# Patient Record
Sex: FEMALE | Race: BLACK OR AFRICAN AMERICAN | NOT HISPANIC OR LATINO | Employment: FULL TIME | URBAN - METROPOLITAN AREA
[De-identification: names, ages, dates, MRNs, and addresses within clinical notes are randomized per-mention and may not be internally consistent; named-entity substitution may affect disease eponyms.]

---

## 2018-09-15 ENCOUNTER — HOSPITAL ENCOUNTER (EMERGENCY)
Facility: OTHER | Age: 38
Discharge: HOME | End: 2018-09-15
Attending: EMERGENCY MEDICINE
Payer: MEDICAID

## 2018-09-15 VITALS
DIASTOLIC BLOOD PRESSURE: 81 MMHG | HEIGHT: 64 IN | WEIGHT: 230 LBS | OXYGEN SATURATION: 97 % | TEMPERATURE: 99 F | HEART RATE: 86 BPM | RESPIRATION RATE: 20 BRPM | BODY MASS INDEX: 39.27 KG/M2 | SYSTOLIC BLOOD PRESSURE: 139 MMHG

## 2018-09-15 DIAGNOSIS — R81 ELEVATED SERUM GLUCOSE WITH GLUCOSURIA: ICD-10-CM

## 2018-09-15 DIAGNOSIS — R73.09 ELEVATED SERUM GLUCOSE WITH GLUCOSURIA: ICD-10-CM

## 2018-09-15 DIAGNOSIS — R73.9 HYPERGLYCEMIA: Primary | ICD-10-CM

## 2018-09-15 DIAGNOSIS — R35.0 URINARY FREQUENCY: ICD-10-CM

## 2018-09-15 LAB
ANION GAP SERPL CALC-SCNC: 10 MMOL/L
BILIRUB UR QL STRIP: NEGATIVE
BUN SERPL-MCNC: 10 MG/DL
CALCIUM SERPL-MCNC: 9.5 MG/DL
CHLORIDE SERPL-SCNC: 105 MMOL/L
CLARITY UR: CLEAR
CO2 SERPL-SCNC: 26 MMOL/L
COLOR UR: YELLOW
CREAT SERPL-MCNC: 0.8 MG/DL
EST. GFR  (AFRICAN AMERICAN): >60 ML/MIN/1.73 M^2
EST. GFR  (NON AFRICAN AMERICAN): >60 ML/MIN/1.73 M^2
GLUCOSE SERPL-MCNC: 255 MG/DL
GLUCOSE UR QL STRIP: ABNORMAL
HGB UR QL STRIP: NEGATIVE
KETONES UR QL STRIP: NEGATIVE
LEUKOCYTE ESTERASE UR QL STRIP: NEGATIVE
NITRITE UR QL STRIP: NEGATIVE
PH UR STRIP: 6 [PH] (ref 5–8)
POCT GLUCOSE: 258 MG/DL (ref 70–110)
POTASSIUM SERPL-SCNC: 3.7 MMOL/L
PROT UR QL STRIP: NEGATIVE
SODIUM SERPL-SCNC: 141 MMOL/L
SP GR UR STRIP: >=1.03 (ref 1–1.03)
URN SPEC COLLECT METH UR: ABNORMAL
UROBILINOGEN UR STRIP-ACNC: 1 EU/DL

## 2018-09-15 PROCEDURE — 80048 BASIC METABOLIC PNL TOTAL CA: CPT

## 2018-09-15 PROCEDURE — 81003 URINALYSIS AUTO W/O SCOPE: CPT

## 2018-09-15 PROCEDURE — 82962 GLUCOSE BLOOD TEST: CPT

## 2018-09-15 PROCEDURE — 99283 EMERGENCY DEPT VISIT LOW MDM: CPT | Mod: 25

## 2018-09-15 RX ORDER — INSULIN GLARGINE 100 [IU]/ML
30 INJECTION, SOLUTION SUBCUTANEOUS NIGHTLY
COMMUNITY
End: 2020-03-04 | Stop reason: CLARIF

## 2018-09-16 NOTE — ED NOTES
NEURO: Pt AAO x 4. Behavior and speech appropriate to situation.   CARDIAC: pt denies chest pain  RESPIRATORY: Respirations even and unlabored.   MUSCULOSKELETAL: Active ROM noted to extremities  GASTRO: non tender to palpation

## 2018-09-16 NOTE — ED TRIAGE NOTES
Pt presents with increased urinary frequency, onset. Pt states she is dehydrated and thinks she has a UTI. Pt states seen at [facility] yesterday for diarrhea onset 1 week ago. Mild transient abdominal pain. Pt states 3 BMS today, states diarrhea is improving. Pt denies dysuria and hematuria. Pt denies Pt denies NV, fever, chills. Pt is well appearing, pt in NAD.

## 2018-09-16 NOTE — ED PROVIDER NOTES
"Encounter Date: 9/15/2018    SCRIBE #1 NOTE: I, Lou Fang, am scribing for, and in the presence of, Dr. Loaiza.       History     Chief Complaint   Patient presents with    Urinary Frequency     with polydipsia x1 week pta, states "I just been so dehydrated lately, drinking all kinds of water and power aid", denies dysuria or blood noted in urine. pt has hx of DM but "I only take my insulin at night time"     Time seen by provider: 9:25 PM    This is a 37 y.o. female, with a history of hypertension and IDDM, who presents to the ED with complaint of "dehydration". She also reports diarrhea for the past week that occurs with consuming liquids and food.  She reports that she went to [facility] two nights ago for feeling dehydrated and they treated her for nausea - "and I wasn't even having nausea or vomiting."  She also reports feeling lightheaded and abdominal pain (cramps) preceding diarrhea. She also reports that the diarrhea has improved with consistency less watery, but bowel movements still frequent. She reports she had three bowel movements today. She also reports frequency in urination with the "feeling of a UTI", but denies any other urinary problems. She also denies fevers, chills, congestion, sore throat, cough, nausea, and vomiting.     She notes that she has a history of diabetes, but does not always take her nightly insulin.  She also admits that she does not always follow a diabetic diet.      The history is provided by the patient.     Review of patient's allergies indicates:   Allergen Reactions    Asa [aspirin]      Tongue lump up and nauseated     Past Medical History:   Diagnosis Date    Diabetes mellitus     Hypertension      Past Surgical History:   Procedure Laterality Date    ANKLE SURGERY Bilateral      SECTION      HYSTERECTOMY       History reviewed. No pertinent family history.  Social History     Tobacco Use    Smoking status: Former Smoker    Smokeless tobacco: Never Used "   Substance Use Topics    Alcohol use: Yes     Comment: socially    Drug use: No     Review of Systems   Constitutional: Negative for chills and fever.   HENT: Negative for congestion, rhinorrhea and sore throat.    Respiratory: Negative for cough.    Cardiovascular: Negative for chest pain.   Gastrointestinal: Positive for abdominal pain and diarrhea (resolved). Negative for nausea and vomiting.   Endocrine: Negative for polyuria.   Genitourinary: Positive for frequency. Negative for decreased urine volume, difficulty urinating, dysuria, enuresis, hematuria and urgency.   Musculoskeletal: Negative for back pain and neck pain.   Skin: Negative for rash.   Neurological: Positive for light-headedness. Negative for weakness.       Physical Exam     Initial Vitals [09/15/18 2054]   BP Pulse Resp Temp SpO2   137/89 97 20 98.7 °F (37.1 °C) 98 %      MAP       --         Physical Exam    Nursing note and vitals reviewed.  Constitutional: She appears well-developed and well-nourished. She is not diaphoretic. No distress.   HENT:   Head: Normocephalic and atraumatic.   Eyes: Conjunctivae and EOM are normal. No scleral icterus.   Neck: Normal range of motion. Neck supple.   Cardiovascular: Normal rate, regular rhythm and normal heart sounds. Exam reveals no gallop and no friction rub.    No murmur heard.  Pulmonary/Chest: Breath sounds normal. No respiratory distress. She has no wheezes. She has no rhonchi. She has no rales.   Abdominal: Soft. Bowel sounds are normal. She exhibits no distension. There is no tenderness. There is no rebound and no guarding.   Musculoskeletal: Normal range of motion. She exhibits no edema or tenderness.   Lymphadenopathy:     She has no cervical adenopathy.   Neurological: She is alert and oriented to person, place, and time.   Skin: Skin is warm and dry. No rash noted. No erythema. No pallor.   Psychiatric: She has a normal mood and affect. Her behavior is normal. Judgment and thought content  normal.         ED Course   Procedures  Labs Reviewed   URINALYSIS - Abnormal; Notable for the following components:       Result Value    Specific Gravity, UA >=1.030 (*)     Glucose, UA 1+ (*)     All other components within normal limits   BASIC METABOLIC PANEL - Abnormal; Notable for the following components:    Glucose 255 (*)     All other components within normal limits   POCT GLUCOSE - Abnormal; Notable for the following components:    POCT Glucose 258 (*)     All other components within normal limits          Imaging Results    None          Medical Decision Making:   Clinical Tests:   Lab Tests: Ordered and Reviewed  ED Management:  Urgent evaluation of a 37-year-old diabetic female with noncompliance who presents with complaints of feeling dehydrated, diarrhea and lightheadedness, UTI symptoms. Vital signs are benign, afebrile.  Physical exam is benign.  Mucous membranes are moist and she has no abdominal tenderness.  Urinalysis shows only glucosuria, no ketones or evidence of a UTI.  BMP shows elevated glucose only, no significant dehydration.  Patient was treated in the ED with oral fluids which she tolerated well. I advised her that I suspect most of her symptoms are related to noncompliance with insulin and elevated glucose.  She expressed understanding, and states she will start taking my medicine tonight.  I am comfortable with discharge home.  She is advised close follow-up with PCP or return for new or worsening symptoms.            Scribe Attestation:   Scribe #1: I performed the above scribed service and the documentation accurately describes the services I performed. I attest to the accuracy of the note.    Attending Attestation:           Physician Attestation for Scribe:  Physician Attestation Statement for Scribe #1: I, Dr. Loaiza, reviewed documentation, as scribed by Lou Fang in my presence, and it is both accurate and complete.                    Clinical Impression:     1.  Hyperglycemia    2. Urinary frequency    3. Elevated serum glucose with glucosuria                                   Shannan Loaiza MD  09/16/18 5506

## 2018-12-18 ENCOUNTER — HOSPITAL ENCOUNTER (EMERGENCY)
Facility: OTHER | Age: 38
Discharge: HOME | End: 2018-12-18
Attending: EMERGENCY MEDICINE
Payer: MEDICAID

## 2018-12-18 VITALS
DIASTOLIC BLOOD PRESSURE: 99 MMHG | BODY MASS INDEX: 39.27 KG/M2 | SYSTOLIC BLOOD PRESSURE: 139 MMHG | OXYGEN SATURATION: 97 % | WEIGHT: 230 LBS | TEMPERATURE: 99 F | HEART RATE: 98 BPM | HEIGHT: 64 IN | RESPIRATION RATE: 17 BRPM

## 2018-12-18 DIAGNOSIS — R05.9 COUGH: ICD-10-CM

## 2018-12-18 DIAGNOSIS — B34.9 VIRAL SYNDROME: Primary | ICD-10-CM

## 2018-12-18 LAB
DEPRECATED S PYO AG THROAT QL EIA: NEGATIVE
FLUAV AG SPEC QL IA: NEGATIVE
FLUBV AG SPEC QL IA: NEGATIVE
POCT GLUCOSE: 307 MG/DL (ref 70–110)
SPECIMEN SOURCE: NORMAL

## 2018-12-18 PROCEDURE — 25000003 PHARM REV CODE 250: Performed by: PHYSICIAN ASSISTANT

## 2018-12-18 PROCEDURE — 99284 EMERGENCY DEPT VISIT MOD MDM: CPT | Mod: 25

## 2018-12-18 PROCEDURE — 87880 STREP A ASSAY W/OPTIC: CPT

## 2018-12-18 PROCEDURE — 87081 CULTURE SCREEN ONLY: CPT

## 2018-12-18 PROCEDURE — 87400 INFLUENZA A/B EACH AG IA: CPT | Mod: 59

## 2018-12-18 PROCEDURE — 82962 GLUCOSE BLOOD TEST: CPT

## 2018-12-18 RX ORDER — IBUPROFEN 400 MG/1
800 TABLET ORAL
Status: COMPLETED | OUTPATIENT
Start: 2018-12-18 | End: 2018-12-18

## 2018-12-18 RX ORDER — NAPROXEN 500 MG/1
500 TABLET ORAL
Status: DISCONTINUED | OUTPATIENT
Start: 2018-12-18 | End: 2018-12-18

## 2018-12-18 RX ORDER — BENZONATATE 100 MG/1
100 CAPSULE ORAL 3 TIMES DAILY PRN
Qty: 20 CAPSULE | Refills: 0 | Status: SHIPPED | OUTPATIENT
Start: 2018-12-18 | End: 2018-12-28

## 2018-12-18 RX ORDER — GUAIFENESIN AND DEXTROMETHORPHAN HYDROBROMIDE 1200; 60 MG/1; MG/1
1 TABLET, EXTENDED RELEASE ORAL 2 TIMES DAILY
Qty: 20 TABLET | Refills: 0 | Status: SHIPPED | OUTPATIENT
Start: 2018-12-18 | End: 2018-12-28

## 2018-12-18 RX ORDER — FLUTICASONE PROPIONATE 50 MCG
1 SPRAY, SUSPENSION (ML) NASAL 2 TIMES DAILY PRN
Qty: 15 G | Refills: 0 | Status: SHIPPED | OUTPATIENT
Start: 2018-12-18 | End: 2019-07-28 | Stop reason: SDUPTHER

## 2018-12-18 RX ORDER — IBUPROFEN 800 MG/1
800 TABLET ORAL EVERY 6 HOURS PRN
Qty: 20 TABLET | Refills: 0 | Status: SHIPPED | OUTPATIENT
Start: 2018-12-18 | End: 2019-07-28

## 2018-12-18 RX ADMIN — IBUPROFEN 800 MG: 400 TABLET, FILM COATED ORAL at 02:12

## 2018-12-18 NOTE — ED TRIAGE NOTES
Pt presents with c/o cough, congestion, body aches, and sore throat with SOB at night, onset about a month ago. Pt tried otc meds with minimal relief. Pt denies fever/chills, n/v/d, or chest pain. Pt AAOx4, RR even and unlabored, NAD noted.

## 2018-12-18 NOTE — ED PROVIDER NOTES
Encounter Date: 2018       History     Chief Complaint   Patient presents with    Cough     Pt reports non-productive cough for the past month with SOB at night. She has been taking otc meds with no relief. Pt also reports severe body aches and sore throat since yesterday. Pt also c/o left arm pain     Patient is a 38-year-old female with a past medical history of hypertension, diabetes, presenting to the emergency department complaints of cough, nasal congestion, rhinorrhea, body aches.  The patient states that she has had a nonproductive cough for a few weeks but states she feels as though she has gotten worse.  She reports subjective fevers and chills but has not taken her temperature.  She reports a sore throat and body aches.  She states she has tried over-the-counter medication including Bre[place], NyQuil, DayQuil with no significant improvement.  She denies any chest pain.This is the extent of the patient's complaints at this time.         The history is provided by the patient.     Review of patient's allergies indicates:   Allergen Reactions    Asa [aspirin]      Tongue lump up and nauseated     Past Medical History:   Diagnosis Date    Diabetes mellitus     Hypertension      Past Surgical History:   Procedure Laterality Date    ANKLE SURGERY Bilateral      SECTION      HYSTERECTOMY       History reviewed. No pertinent family history.  Social History     Tobacco Use    Smoking status: Former Smoker    Smokeless tobacco: Never Used   Substance Use Topics    Alcohol use: Yes     Comment: socially    Drug use: No     Review of Systems   Constitutional: Positive for fatigue. Negative for activity change, appetite change, chills and fever.   HENT: Positive for congestion, rhinorrhea and sore throat.    Eyes: Negative for photophobia and visual disturbance.   Respiratory: Positive for cough. Negative for shortness of breath and wheezing.    Cardiovascular: Negative for chest pain.    Gastrointestinal: Negative for abdominal pain, diarrhea, nausea and vomiting.   Genitourinary: Negative for dysuria, hematuria and urgency.   Musculoskeletal: Positive for myalgias. Negative for back pain and neck pain.   Skin: Negative for color change and wound.   Neurological: Negative for weakness and headaches.   Psychiatric/Behavioral: Negative for agitation and confusion.       Physical Exam     Initial Vitals [12/18/18 1304]   BP Pulse Resp Temp SpO2   (!) 144/83 100 18 98 °F (36.7 °C) 96 %      MAP       --         Physical Exam    Nursing note and vitals reviewed.  Constitutional: She appears well-developed and well-nourished. She is not diaphoretic.  Non-toxic appearance. She does not have a sickly appearance. She does not appear ill. No distress.   Well-appearing, obese,  female accompanied by her  in the emergency department.  Speaking clearly in full sentences.  No acute distress.   HENT:   Head: Normocephalic and atraumatic.   Right Ear: Hearing, tympanic membrane, external ear and ear canal normal.   Left Ear: Hearing, tympanic membrane, external ear and ear canal normal.   Nose: Mucosal edema and rhinorrhea present.   Mouth/Throat: Uvula is midline, oropharynx is clear and moist and mucous membranes are normal.   Boggy nasal mucosa noted with paper towel shoved in her left nostril.  Swallowing handling oral secretions.   Eyes: Conjunctivae and EOM are normal.   Neck: Normal range of motion. Neck supple.   Cardiovascular: Normal rate, regular rhythm and normal heart sounds.   Pulmonary/Chest: Breath sounds normal. No respiratory distress. She has no wheezes.   Musculoskeletal: Normal range of motion.   Neurological: She is alert and oriented to person, place, and time. GCS eye subscore is 4. GCS verbal subscore is 5. GCS motor subscore is 6.   Skin: Skin is warm.   Psychiatric: She has a normal mood and affect. Her behavior is normal. Judgment and thought content normal.          ED Course   Procedures  Labs Reviewed   POCT GLUCOSE - Abnormal; Notable for the following components:       Result Value    POCT Glucose 307 (*)     All other components within normal limits   THROAT SCREEN, RAPID   CULTURE, STREP A,  THROAT   INFLUENZA A AND B ANTIGEN          Imaging Results          X-Ray Chest PA And Lateral (Final result)  Result time 12/18/18 13:37:27    Final result by Panchito Chaparro MD (12/18/18 13:37:27)                 Impression:      No acute chest disease identified.      Electronically signed by: Panchito Chaparro MD  Date:    12/18/2018  Time:    13:37             Narrative:    EXAMINATION:  XR CHEST PA AND LATERAL    CLINICAL HISTORY:  Cough    TECHNIQUE:  PA and lateral views of the chest were performed.    COMPARISON:  None    FINDINGS:  The cardiac silhouette and superior mediastinal structures are unremarkable. Pulmonary vasculature is within normal limits. The lungs are well aerated and free of focal consolidations. There is no evidence for pneumothorax or pleural effusions. Bony structures are grossly intact.                              X-Rays:   Independently Interpreted Readings:   Chest X-Ray: No infiltrates.  No acute abnormalities.  Normal heart size.     Medical Decision Making:   Initial Assessment:   Urgent evaluation of a 38 y.o. female with a past medical history of hypertension, diabetes, presenting to the emergency department complaining of multiple complaints. Patient is afebrile, nontoxic appearing and hemodynamically stable. Physical exam reveals boggy nasal mucosa, lungs are clear to auscultation bilaterally.  Active rhinorrhea noted.  Swallowing handling oral secretions without difficulty.  Regular rate and rhythm.  Accu-Chek, rapid strep, influenza and chest x-ray were obtained from triage.  Independently Interpreted Test(s):   I have ordered and independently interpreted X-rays - see prior notes.  Clinical Tests:   Lab Tests: Ordered and  Reviewed  Radiological Study: Ordered and Reviewed  ED Management:  Accu-Chek is 307.  Rapid strep is negative. Rapid influenza is negative. Chest x-ray is unremarkable.  Given patient's history and physical exam findings, signs symptoms likely secondary to a viral etiology.  I do not feel that antibiotics are required at this time.  She is counseled on further symptomatic care and treatment.  Will be discharged home with a prescription for Mucinex, Tessalon Perles, Flonase, ibuprofen.  Of note, she did request that I give her her scheduled carpal tunnel injections while she was here.  I explained that I do not do that, she should follow up with her primary care provider for this as scheduled.The patient was instructed to follow up with a primary care provider in 2 days or to return to the emergency department for worsening symptoms. The treatment plan was discussed with the patient who demonstrated understanding and comfort with plan.    This note was created using [employer] Fluency Direct. There may be typographical errors secondary to dictation.                         Clinical Impression:     1. Viral syndrome    2. Cough         Disposition:   Disposition: Discharged  Condition: Stable                        Kaitlyn Brewer PA-C  12/18/18 7230

## 2018-12-20 LAB — BACTERIA THROAT CULT: NORMAL

## 2019-07-28 ENCOUNTER — HOSPITAL ENCOUNTER (EMERGENCY)
Facility: OTHER | Age: 39
Discharge: HOME | End: 2019-07-29
Attending: EMERGENCY MEDICINE

## 2019-07-28 DIAGNOSIS — N89.8 VAGINAL DISCHARGE: ICD-10-CM

## 2019-07-28 DIAGNOSIS — L20.9 ATOPIC DERMATITIS, UNSPECIFIED TYPE: Primary | ICD-10-CM

## 2019-07-28 DIAGNOSIS — R73.9 HYPERGLYCEMIA WITHOUT KETOSIS: ICD-10-CM

## 2019-07-28 DIAGNOSIS — R51.9 ACUTE NONINTRACTABLE HEADACHE, UNSPECIFIED HEADACHE TYPE: ICD-10-CM

## 2019-07-28 DIAGNOSIS — R09.82 POST-NASAL DRIP: ICD-10-CM

## 2019-07-28 LAB
ANION GAP SERPL CALC-SCNC: 12 MMOL/L (ref 8–16)
B-HCG UR QL: NEGATIVE
B-OH-BUTYR BLD STRIP-SCNC: 0.1 MMOL/L (ref 0–0.5)
BACTERIA #/AREA URNS HPF: NORMAL /HPF
BASOPHILS # BLD AUTO: 0.05 K/UL (ref 0–0.2)
BASOPHILS NFR BLD: 0.7 % (ref 0–1.9)
BILIRUB UR QL STRIP: NEGATIVE
BUN SERPL-MCNC: 11 MG/DL (ref 6–20)
CALCIUM SERPL-MCNC: 9.8 MG/DL (ref 8.7–10.5)
CHLORIDE SERPL-SCNC: 100 MMOL/L (ref 95–110)
CLARITY UR: CLEAR
CO2 SERPL-SCNC: 24 MMOL/L (ref 23–29)
COLOR UR: YELLOW
CREAT SERPL-MCNC: 0.9 MG/DL (ref 0.5–1.4)
CTP QC/QA: YES
DIFFERENTIAL METHOD: ABNORMAL
EOSINOPHIL # BLD AUTO: 0.3 K/UL (ref 0–0.5)
EOSINOPHIL NFR BLD: 3.7 % (ref 0–8)
ERYTHROCYTE [DISTWIDTH] IN BLOOD BY AUTOMATED COUNT: 13.7 % (ref 11.5–14.5)
EST. GFR  (AFRICAN AMERICAN): >60 ML/MIN/1.73 M^2
EST. GFR  (NON AFRICAN AMERICAN): >60 ML/MIN/1.73 M^2
GLUCOSE SERPL-MCNC: 470 MG/DL (ref 70–110)
GLUCOSE UR QL STRIP: ABNORMAL
HCT VFR BLD AUTO: 41.2 % (ref 37–48.5)
HGB BLD-MCNC: 13.1 G/DL (ref 12–16)
HGB UR QL STRIP: ABNORMAL
IMM GRANULOCYTES # BLD AUTO: 0.02 K/UL (ref 0–0.04)
IMM GRANULOCYTES NFR BLD AUTO: 0.3 % (ref 0–0.5)
KETONES UR QL STRIP: NEGATIVE
LEUKOCYTE ESTERASE UR QL STRIP: NEGATIVE
LYMPHOCYTES # BLD AUTO: 2.5 K/UL (ref 1–4.8)
LYMPHOCYTES NFR BLD: 36.7 % (ref 18–48)
MCH RBC QN AUTO: 26.4 PG (ref 27–31)
MCHC RBC AUTO-ENTMCNC: 31.8 G/DL (ref 32–36)
MCV RBC AUTO: 83 FL (ref 82–98)
MICROSCOPIC COMMENT: NORMAL
MONOCYTES # BLD AUTO: 0.4 K/UL (ref 0.3–1)
MONOCYTES NFR BLD: 6.5 % (ref 4–15)
NEUTROPHILS # BLD AUTO: 3.6 K/UL (ref 1.8–7.7)
NEUTROPHILS NFR BLD: 52.1 % (ref 38–73)
NITRITE UR QL STRIP: NEGATIVE
NRBC BLD-RTO: 0 /100 WBC
PH UR STRIP: 6 [PH] (ref 5–8)
PLATELET # BLD AUTO: 301 K/UL (ref 150–350)
PMV BLD AUTO: 10.7 FL (ref 9.2–12.9)
POCT GLUCOSE: 434 MG/DL (ref 70–110)
POTASSIUM SERPL-SCNC: 4.2 MMOL/L (ref 3.5–5.1)
PROT UR QL STRIP: NEGATIVE
RBC # BLD AUTO: 4.96 M/UL (ref 4–5.4)
RBC #/AREA URNS HPF: 0 /HPF (ref 0–4)
SODIUM SERPL-SCNC: 136 MMOL/L (ref 136–145)
SP GR UR STRIP: 1.01 (ref 1–1.03)
URN SPEC COLLECT METH UR: ABNORMAL
UROBILINOGEN UR STRIP-ACNC: NEGATIVE EU/DL
WBC # BLD AUTO: 6.82 K/UL (ref 3.9–12.7)
WBC #/AREA URNS HPF: 2 /HPF (ref 0–5)
YEAST URNS QL MICRO: NORMAL

## 2019-07-28 PROCEDURE — 82010 KETONE BODYS QUAN: CPT

## 2019-07-28 PROCEDURE — 96375 TX/PRO/DX INJ NEW DRUG ADDON: CPT | Mod: 59

## 2019-07-28 PROCEDURE — 25000003 PHARM REV CODE 250: Performed by: PHYSICIAN ASSISTANT

## 2019-07-28 PROCEDURE — 96361 HYDRATE IV INFUSION ADD-ON: CPT

## 2019-07-28 PROCEDURE — 85025 COMPLETE CBC W/AUTO DIFF WBC: CPT

## 2019-07-28 PROCEDURE — 80048 BASIC METABOLIC PNL TOTAL CA: CPT

## 2019-07-28 PROCEDURE — 99284 EMERGENCY DEPT VISIT MOD MDM: CPT | Mod: 25

## 2019-07-28 PROCEDURE — 36415 COLL VENOUS BLD VENIPUNCTURE: CPT

## 2019-07-28 PROCEDURE — 82962 GLUCOSE BLOOD TEST: CPT

## 2019-07-28 PROCEDURE — 63600175 PHARM REV CODE 636 W HCPCS: Performed by: PHYSICIAN ASSISTANT

## 2019-07-28 PROCEDURE — 81000 URINALYSIS NONAUTO W/SCOPE: CPT

## 2019-07-28 PROCEDURE — 81025 URINE PREGNANCY TEST: CPT | Performed by: EMERGENCY MEDICINE

## 2019-07-28 PROCEDURE — 96374 THER/PROPH/DIAG INJ IV PUSH: CPT

## 2019-07-28 RX ORDER — TRIAMCINOLONE ACETONIDE 0.25 MG/G
CREAM TOPICAL 2 TIMES DAILY
Qty: 15 G | Refills: 0 | Status: SHIPPED | OUTPATIENT
Start: 2019-07-28 | End: 2020-03-04 | Stop reason: CLARIF

## 2019-07-28 RX ORDER — ACETAMINOPHEN 500 MG
1000 TABLET ORAL
Status: COMPLETED | OUTPATIENT
Start: 2019-07-28 | End: 2019-07-28

## 2019-07-28 RX ORDER — KETOROLAC TROMETHAMINE 30 MG/ML
15 INJECTION, SOLUTION INTRAMUSCULAR; INTRAVENOUS
Status: COMPLETED | OUTPATIENT
Start: 2019-07-28 | End: 2019-07-28

## 2019-07-28 RX ORDER — FLUTICASONE PROPIONATE 50 MCG
1 SPRAY, SUSPENSION (ML) NASAL 2 TIMES DAILY
Qty: 15 G | Refills: 0 | Status: SHIPPED | OUTPATIENT
Start: 2019-07-28 | End: 2020-03-04 | Stop reason: CLARIF

## 2019-07-28 RX ORDER — FLUCONAZOLE 150 MG/1
150 TABLET ORAL DAILY
Qty: 1 TABLET | Refills: 0 | Status: SHIPPED | OUTPATIENT
Start: 2019-07-28 | End: 2019-07-29

## 2019-07-28 RX ADMIN — KETOROLAC TROMETHAMINE 15 MG: 30 INJECTION, SOLUTION INTRAMUSCULAR at 10:07

## 2019-07-28 RX ADMIN — ACETAMINOPHEN 1000 MG: 500 TABLET ORAL at 11:07

## 2019-07-28 RX ADMIN — SODIUM CHLORIDE 1000 ML: 0.9 INJECTION, SOLUTION INTRAVENOUS at 11:07

## 2019-07-28 RX ADMIN — SODIUM CHLORIDE 1000 ML: 0.9 INJECTION, SOLUTION INTRAVENOUS at 10:07

## 2019-07-29 VITALS
BODY MASS INDEX: 39.82 KG/M2 | RESPIRATION RATE: 16 BRPM | WEIGHT: 233.25 LBS | HEIGHT: 64 IN | SYSTOLIC BLOOD PRESSURE: 156 MMHG | HEART RATE: 87 BPM | OXYGEN SATURATION: 98 % | TEMPERATURE: 99 F | DIASTOLIC BLOOD PRESSURE: 98 MMHG

## 2019-07-29 LAB
POCT GLUCOSE: 295 MG/DL (ref 70–110)
POCT GLUCOSE: 377 MG/DL (ref 70–110)

## 2019-07-29 PROCEDURE — 63600175 PHARM REV CODE 636 W HCPCS: Performed by: PHYSICIAN ASSISTANT

## 2019-07-29 RX ADMIN — INSULIN HUMAN 4 UNITS: 100 INJECTION, SOLUTION PARENTERAL at 12:07

## 2019-07-29 NOTE — ED NOTES
Pt rounding complete.  Pain 7/10, not requesting medication at this time.  Restroom and comfort needs addressed.  Pt updated on plan of care.  Will continue to monitor.

## 2019-07-29 NOTE — ED PROVIDER NOTES
Encounter Date: 2019       History     Chief Complaint   Patient presents with    multiple complaints     sore throat since last night, headaches causing visual disturbances x 2 wks, rash to face x 2 wks     Patient is a 38-year-old female with diabetes and previous diagnosis of hypertension that is now resolved who presents to the emergency department with multiple complaints including rash, headache, and sore throat. Patient reports 2 week history of a scaly rash to her nose and face.  Patient states she has been picking at the rash and she has been applying antibiotic ointment.  Patient also reports 3 episodes of epistaxis over the past week.  She reports daily, frontal headaches for the past 2 weeks.  She reports associated blurry vision.  She denies nausea, emesis, or photophobia.  She reports taking Aleve with relief.  She currently rates her headache a 5/10.  Patient states he has headaches feel similar to when she would would experience headaches with high blood pressure.  She states she was taken off her medication many years ago by her doctor.  Patient states she woke up this morning with a sore throat and hoarse voice.  She denies trouble breathing but states when she feels like she is gagging when she swallows.  She also reports a productive cough.  She denies fever.    The history is provided by the patient.     Review of patient's allergies indicates:   Allergen Reactions    Asa [aspirin]      Tongue lump up and nauseated     Past Medical History:   Diagnosis Date    Diabetes mellitus     Hypertension      Past Surgical History:   Procedure Laterality Date    ANKLE SURGERY Bilateral      SECTION      HYSTERECTOMY       History reviewed. No pertinent family history.  Social History     Tobacco Use    Smoking status: Former Smoker    Smokeless tobacco: Never Used   Substance Use Topics    Alcohol use: Yes     Comment: socially    Drug use: No     Review of Systems    Constitutional: Negative for chills and fever.   HENT: Positive for postnasal drip, sore throat and voice change. Negative for facial swelling.    Eyes: Positive for visual disturbance.   Respiratory: Positive for cough. Negative for shortness of breath.    Cardiovascular: Negative for chest pain.   Gastrointestinal: Negative for abdominal pain, diarrhea, nausea and vomiting.   Genitourinary: Negative for dysuria.   Musculoskeletal: Negative for arthralgias.   Skin: Positive for rash.   Neurological: Positive for headaches.       Physical Exam     Initial Vitals [07/28/19 2115]   BP Pulse Resp Temp SpO2   (!) 140/91 (!) 114 20 98.5 °F (36.9 °C) (!) 93 %      MAP       --         Physical Exam    Constitutional: Vital signs are normal. She is Obese . She is cooperative. No distress.   Patient appears anxious   HENT:   Head: Normocephalic and atraumatic.   Nose: Nose normal.   Hoarse voice.  Cobblestoning to posterior oropharynx.  Tonsils are not enlarged.  No exudates. Uvula is midline. No trismus.   Eyes: EOM are normal. Pupils are equal, round, and reactive to light.   Neck: Normal range of motion. Neck supple.   Cardiovascular: Regular rhythm and intact distal pulses. Tachycardia present.    Pulmonary/Chest: Breath sounds normal. She has no wheezes. She has no rhonchi. She has no rales.   Abdominal: Soft. Bowel sounds are normal. There is no tenderness.   Musculoskeletal: Normal range of motion. She exhibits no edema.   Neurological: She is alert and oriented to person, place, and time. She has normal strength. No cranial nerve deficit. GCS score is 15. GCS eye subscore is 4. GCS verbal subscore is 5. GCS motor subscore is 6.   Skin: Skin is warm and dry. Rash (Erythematous scaly rash most prominent to the nose, scattered lesions noted to the forehead and eyebrows.  No pustules, crusting or drainage) noted.         ED Course   Procedures  Labs Reviewed   CBC W/ AUTO DIFFERENTIAL - Abnormal; Notable for the  following components:       Result Value    Mean Corpuscular Hemoglobin 26.4 (*)     Mean Corpuscular Hemoglobin Conc 31.8 (*)     All other components within normal limits   BASIC METABOLIC PANEL - Abnormal; Notable for the following components:    Glucose 470 (*)     All other components within normal limits    Narrative:        critical result(s) called and verbal readback obtained from   NAVARRO MUNSON RN, 07/28/2019 22:47   URINALYSIS, REFLEX TO URINE CULTURE - Abnormal; Notable for the following components:    Glucose, UA 3+ (*)     Occult Blood UA Trace (*)     All other components within normal limits    Narrative:     Preferred Collection Type->Urine, Clean Catch   POCT GLUCOSE - Abnormal; Notable for the following components:    POCT Glucose 434 (*)     All other components within normal limits   POCT GLUCOSE - Abnormal; Notable for the following components:    POCT Glucose 377 (*)     All other components within normal limits   URINALYSIS MICROSCOPIC    Narrative:     Preferred Collection Type->Urine, Clean Catch   BETA - HYDROXYBUTYRATE, SERUM   POCT URINE PREGNANCY   POCT GLUCOSE MONITORING CONTINUOUS          Imaging Results    None          Medical Decision Making:   Initial Assessment:   Urgent evaluation of a 38 y.o. Female presenting to the emergency department complaining of rash, headaches, and sore throat. Patient is afebrile, nontoxic appearing and hemodynamically stable.  Initial triage vital signs reports hypoxia, repeat pulse ox was 98%.  There is no focal weakness, numbness, meningismus, or other focal neurologic deficit. There is no history of trauma or fever.  Patient does have history of hypertension and blood pressure is mildly elevated here in the emergency department today.  I do not suspect SAH.  Patient has no evidence of strep pharyngitis, PTA, RPA or epiglottitis.  Patient has evidence of postnasal drip which is likely causing sore throat and cough.  Patient has atopic dermatitis rash  on her face.  Will obtain blood work and provide patient with IV fluids and Toradol.    ED Management:  Accu-Chek was 434.  CBC is no significant abnormality.  Chemistry reveals glucose of 470.  Normal bicarbonate and anion gap.  Hydroxybutyrate is normal and there are no ketones in her urine.  No evidence of DKA.  Hyperglycemia is likely contributing to patient's symptoms  After 2 L of IV fluids, patient's repeat Accu-Chek is 377.  Will administer 4 units of insulin.  Case discussed with khushbu Banks signed over at 1 am  Other:   I have discussed this case with another health care provider.       <> Summary of the Discussion: Patient also mentioned upon discharge that she has white, clumpy vaginal discharge and some irritation.  This is likely secondary to yeast infection.  Will send home with Diflucan and advised pt to follow up with her regular doctor.                      Clinical Impression:     1. Atopic dermatitis, unspecified type    2. Hyperglycemia without ketosis    3. Post-nasal drip    4. Acute nonintractable headache, unspecified headache type    5. Vaginal discharge                               Alberto Kang PA-C  07/29/19 0105

## 2019-07-29 NOTE — ED TRIAGE NOTES
"Pt arrived with multiple complaints.  Reports rash to L side of nose x 1 week, states "it started out kind of scaly and I've been picking at it so it got worse."  Lesion appears red and purple, pt states she has been applying topical abx.    Pt also c/o sore throat.  States she woke up with a hoarse voice.  Denies any difficulty breathing, reports some difficulty swallowing, states, "It feels like I'm gagging."    Pt also states she has had 3 nosebleeds in the past 2 days.  Reports they last about 3 minutes.    Pt also c/o "tingling to her L ear since yesterday."    Reports HA x 2.5 weeks that are starting to cause "blurry vision."    Pt states she has been "googling her symptoms and is afraid her diabetes is acting up, but was unable to reach her PCP."  "

## 2019-10-06 ENCOUNTER — HOSPITAL ENCOUNTER (INPATIENT)
Facility: OTHER | Age: 39
LOS: 4 days | Discharge: HOME | DRG: 871 | End: 2019-10-10
Attending: EMERGENCY MEDICINE | Admitting: HOSPITALIST

## 2019-10-06 DIAGNOSIS — Z79.4 TYPE 2 DIABETES MELLITUS WITH HYPERGLYCEMIA, WITH LONG-TERM CURRENT USE OF INSULIN: ICD-10-CM

## 2019-10-06 DIAGNOSIS — J18.9 PNEUMONIA OF RIGHT LOWER LOBE DUE TO INFECTIOUS ORGANISM: Primary | ICD-10-CM

## 2019-10-06 DIAGNOSIS — R82.71 ASYMPTOMATIC BACTERIURIA: ICD-10-CM

## 2019-10-06 DIAGNOSIS — I10 ESSENTIAL HYPERTENSION: ICD-10-CM

## 2019-10-06 DIAGNOSIS — E11.65 TYPE 2 DIABETES MELLITUS WITH HYPERGLYCEMIA, WITH LONG-TERM CURRENT USE OF INSULIN: ICD-10-CM

## 2019-10-06 DIAGNOSIS — R73.9 HYPERGLYCEMIA: ICD-10-CM

## 2019-10-06 DIAGNOSIS — N12 PYELONEPHRITIS: ICD-10-CM

## 2019-10-06 DIAGNOSIS — R00.0 TACHYCARDIA: ICD-10-CM

## 2019-10-06 PROBLEM — N39.0 UTI (URINARY TRACT INFECTION): Status: ACTIVE | Noted: 2019-10-06

## 2019-10-06 PROBLEM — E11.9 TYPE 2 DIABETES MELLITUS, WITH LONG-TERM CURRENT USE OF INSULIN: Status: ACTIVE | Noted: 2019-10-06

## 2019-10-06 LAB
ALBUMIN SERPL BCP-MCNC: 3.1 G/DL (ref 3.5–5.2)
ALLENS TEST: ABNORMAL
ALP SERPL-CCNC: 109 U/L (ref 55–135)
ALT SERPL W/O P-5'-P-CCNC: 27 U/L (ref 10–44)
ANION GAP SERPL CALC-SCNC: 10 MMOL/L (ref 8–16)
ANION GAP SERPL CALC-SCNC: 12 MMOL/L (ref 8–16)
AST SERPL-CCNC: 28 U/L (ref 10–40)
B-HCG UR QL: NEGATIVE
B-OH-BUTYR BLD STRIP-SCNC: 0.1 MMOL/L (ref 0–0.5)
BACTERIA #/AREA URNS HPF: ABNORMAL /HPF
BASOPHILS # BLD AUTO: 0.03 K/UL (ref 0–0.2)
BASOPHILS NFR BLD: 0.4 % (ref 0–1.9)
BILIRUB SERPL-MCNC: 0.6 MG/DL (ref 0.1–1)
BILIRUB UR QL STRIP: NEGATIVE
BILIRUB UR QL STRIP: NEGATIVE
BUN SERPL-MCNC: 6 MG/DL (ref 6–20)
BUN SERPL-MCNC: 6 MG/DL (ref 6–20)
CALCIUM SERPL-MCNC: 8.5 MG/DL (ref 8.7–10.5)
CALCIUM SERPL-MCNC: 9.7 MG/DL (ref 8.7–10.5)
CHLORIDE SERPL-SCNC: 101 MMOL/L (ref 95–110)
CHLORIDE SERPL-SCNC: 97 MMOL/L (ref 95–110)
CLARITY UR: ABNORMAL
CLARITY UR: ABNORMAL
CO2 SERPL-SCNC: 22 MMOL/L (ref 23–29)
CO2 SERPL-SCNC: 22 MMOL/L (ref 23–29)
COLOR UR: YELLOW
COLOR UR: YELLOW
CREAT SERPL-MCNC: 0.8 MG/DL (ref 0.5–1.4)
CREAT SERPL-MCNC: 1 MG/DL (ref 0.5–1.4)
CTP QC/QA: YES
DELSYS: ABNORMAL
DIFFERENTIAL METHOD: ABNORMAL
EOSINOPHIL # BLD AUTO: 0 K/UL (ref 0–0.5)
EOSINOPHIL NFR BLD: 0.1 % (ref 0–8)
ERYTHROCYTE [DISTWIDTH] IN BLOOD BY AUTOMATED COUNT: 13.6 % (ref 11.5–14.5)
ERYTHROCYTE [SEDIMENTATION RATE] IN BLOOD BY WESTERGREN METHOD: 18 MM/H
EST. GFR  (AFRICAN AMERICAN): >60 ML/MIN/1.73 M^2
EST. GFR  (AFRICAN AMERICAN): >60 ML/MIN/1.73 M^2
EST. GFR  (NON AFRICAN AMERICAN): >60 ML/MIN/1.73 M^2
EST. GFR  (NON AFRICAN AMERICAN): >60 ML/MIN/1.73 M^2
FIO2: 21
GLUCOSE SERPL-MCNC: 326 MG/DL (ref 70–110)
GLUCOSE SERPL-MCNC: 398 MG/DL (ref 70–110)
GLUCOSE UR QL STRIP: ABNORMAL
GLUCOSE UR QL STRIP: ABNORMAL
HCO3 UR-SCNC: 23.8 MMOL/L (ref 24–28)
HCT VFR BLD AUTO: 40.3 % (ref 37–48.5)
HGB BLD-MCNC: 12.9 G/DL (ref 12–16)
HGB UR QL STRIP: ABNORMAL
HGB UR QL STRIP: ABNORMAL
IMM GRANULOCYTES # BLD AUTO: 0.05 K/UL (ref 0–0.04)
IMM GRANULOCYTES NFR BLD AUTO: 0.6 % (ref 0–0.5)
INFLUENZA A, MOLECULAR: NEGATIVE
INFLUENZA B, MOLECULAR: NEGATIVE
KETONES UR QL STRIP: NEGATIVE
KETONES UR QL STRIP: NEGATIVE
LACTATE SERPL-SCNC: 4.1 MMOL/L (ref 0.5–2.2)
LACTATE SERPL-SCNC: 4.1 MMOL/L (ref 0.5–2.2)
LEUKOCYTE ESTERASE UR QL STRIP: ABNORMAL
LEUKOCYTE ESTERASE UR QL STRIP: ABNORMAL
LYMPHOCYTES # BLD AUTO: 1.2 K/UL (ref 1–4.8)
LYMPHOCYTES NFR BLD: 13.9 % (ref 18–48)
MAGNESIUM SERPL-MCNC: 1.6 MG/DL (ref 1.6–2.6)
MCH RBC QN AUTO: 26.2 PG (ref 27–31)
MCHC RBC AUTO-ENTMCNC: 32 G/DL (ref 32–36)
MCV RBC AUTO: 82 FL (ref 82–98)
MICROSCOPIC COMMENT: ABNORMAL
MODE: ABNORMAL
MONOCYTES # BLD AUTO: 0.6 K/UL (ref 0.3–1)
MONOCYTES NFR BLD: 7.6 % (ref 4–15)
NEUTROPHILS # BLD AUTO: 6.5 K/UL (ref 1.8–7.7)
NEUTROPHILS NFR BLD: 77.4 % (ref 38–73)
NITRITE UR QL STRIP: NEGATIVE
NITRITE UR QL STRIP: NEGATIVE
NRBC BLD-RTO: 0 /100 WBC
PCO2 BLDA: 38.2 MMHG (ref 35–45)
PH SMN: 7.4 [PH] (ref 7.35–7.45)
PH UR STRIP: 6 [PH] (ref 5–8)
PH UR STRIP: 6 [PH] (ref 5–8)
PLATELET # BLD AUTO: 246 K/UL (ref 150–350)
PMV BLD AUTO: 11.3 FL (ref 9.2–12.9)
PO2 BLDA: 21 MMHG (ref 40–60)
POC BE: -1 MMOL/L
POC SATURATED O2: 34 % (ref 95–100)
POCT GLUCOSE: 380 MG/DL (ref 70–110)
POCT GLUCOSE: 430 MG/DL (ref 70–110)
POTASSIUM SERPL-SCNC: 4 MMOL/L (ref 3.5–5.1)
POTASSIUM SERPL-SCNC: 4.6 MMOL/L (ref 3.5–5.1)
PROCALCITONIN SERPL IA-MCNC: 0.46 NG/ML
PROT SERPL-MCNC: 7.9 G/DL (ref 6–8.4)
PROT UR QL STRIP: ABNORMAL
PROT UR QL STRIP: ABNORMAL
RBC # BLD AUTO: 4.92 M/UL (ref 4–5.4)
RBC #/AREA URNS HPF: 4 /HPF (ref 0–4)
SAMPLE: ABNORMAL
SITE: ABNORMAL
SODIUM SERPL-SCNC: 131 MMOL/L (ref 136–145)
SODIUM SERPL-SCNC: 133 MMOL/L (ref 136–145)
SP GR UR STRIP: <=1.005 (ref 1–1.03)
SP GR UR STRIP: <=1.005 (ref 1–1.03)
SP02: 97
SPECIMEN SOURCE: NORMAL
SQUAMOUS #/AREA URNS HPF: 6 /HPF
URN SPEC COLLECT METH UR: ABNORMAL
URN SPEC COLLECT METH UR: ABNORMAL
UROBILINOGEN UR STRIP-ACNC: NEGATIVE EU/DL
UROBILINOGEN UR STRIP-ACNC: NEGATIVE EU/DL
WBC # BLD AUTO: 8.41 K/UL (ref 3.9–12.7)
WBC #/AREA URNS HPF: 15 /HPF (ref 0–5)
WBC CLUMPS URNS QL MICRO: ABNORMAL
YEAST URNS QL MICRO: ABNORMAL

## 2019-10-06 PROCEDURE — 87088 URINE BACTERIA CULTURE: CPT

## 2019-10-06 PROCEDURE — 96361 HYDRATE IV INFUSION ADD-ON: CPT

## 2019-10-06 PROCEDURE — 93005 ELECTROCARDIOGRAM TRACING: CPT

## 2019-10-06 PROCEDURE — 85025 COMPLETE CBC W/AUTO DIFF WBC: CPT

## 2019-10-06 PROCEDURE — 81025 URINE PREGNANCY TEST: CPT | Performed by: NURSE PRACTITIONER

## 2019-10-06 PROCEDURE — 83605 ASSAY OF LACTIC ACID: CPT

## 2019-10-06 PROCEDURE — 93010 EKG 12-LEAD: ICD-10-PCS | Mod: ,,, | Performed by: INTERNAL MEDICINE

## 2019-10-06 PROCEDURE — 80048 BASIC METABOLIC PNL TOTAL CA: CPT

## 2019-10-06 PROCEDURE — 82010 KETONE BODYS QUAN: CPT

## 2019-10-06 PROCEDURE — 84145 PROCALCITONIN (PCT): CPT

## 2019-10-06 PROCEDURE — 99223 1ST HOSP IP/OBS HIGH 75: CPT | Mod: ,,, | Performed by: HOSPITALIST

## 2019-10-06 PROCEDURE — 87502 INFLUENZA DNA AMP PROBE: CPT

## 2019-10-06 PROCEDURE — 93010 ELECTROCARDIOGRAM REPORT: CPT | Mod: ,,, | Performed by: INTERNAL MEDICINE

## 2019-10-06 PROCEDURE — 11000001 HC ACUTE MED/SURG PRIVATE ROOM

## 2019-10-06 PROCEDURE — 82962 GLUCOSE BLOOD TEST: CPT

## 2019-10-06 PROCEDURE — 83735 ASSAY OF MAGNESIUM: CPT

## 2019-10-06 PROCEDURE — 36415 COLL VENOUS BLD VENIPUNCTURE: CPT

## 2019-10-06 PROCEDURE — 82803 BLOOD GASES ANY COMBINATION: CPT

## 2019-10-06 PROCEDURE — 99223 PR INITIAL HOSPITAL CARE,LEVL III: ICD-10-PCS | Mod: ,,, | Performed by: PHYSICIAN ASSISTANT

## 2019-10-06 PROCEDURE — 63600175 PHARM REV CODE 636 W HCPCS: Performed by: PHYSICIAN ASSISTANT

## 2019-10-06 PROCEDURE — 81000 URINALYSIS NONAUTO W/SCOPE: CPT

## 2019-10-06 PROCEDURE — 99291 CRITICAL CARE FIRST HOUR: CPT | Mod: 25

## 2019-10-06 PROCEDURE — 87040 BLOOD CULTURE FOR BACTERIA: CPT

## 2019-10-06 PROCEDURE — 25000003 PHARM REV CODE 250: Performed by: NURSE PRACTITIONER

## 2019-10-06 PROCEDURE — 87086 URINE CULTURE/COLONY COUNT: CPT

## 2019-10-06 PROCEDURE — 96374 THER/PROPH/DIAG INJ IV PUSH: CPT

## 2019-10-06 PROCEDURE — 96375 TX/PRO/DX INJ NEW DRUG ADDON: CPT

## 2019-10-06 PROCEDURE — 25000003 PHARM REV CODE 250: Performed by: PHYSICIAN ASSISTANT

## 2019-10-06 PROCEDURE — 87186 SC STD MICRODIL/AGAR DIL: CPT

## 2019-10-06 PROCEDURE — 99223 PR INITIAL HOSPITAL CARE,LEVL III: ICD-10-PCS | Mod: ,,, | Performed by: HOSPITALIST

## 2019-10-06 PROCEDURE — 63600175 PHARM REV CODE 636 W HCPCS: Performed by: NURSE PRACTITIONER

## 2019-10-06 PROCEDURE — 83036 HEMOGLOBIN GLYCOSYLATED A1C: CPT

## 2019-10-06 PROCEDURE — 87077 CULTURE AEROBIC IDENTIFY: CPT

## 2019-10-06 PROCEDURE — 80053 COMPREHEN METABOLIC PANEL: CPT

## 2019-10-06 PROCEDURE — 99223 1ST HOSP IP/OBS HIGH 75: CPT | Mod: ,,, | Performed by: PHYSICIAN ASSISTANT

## 2019-10-06 PROCEDURE — 99900035 HC TECH TIME PER 15 MIN (STAT)

## 2019-10-06 RX ORDER — SODIUM CHLORIDE 0.9 % (FLUSH) 0.9 %
10 SYRINGE (ML) INJECTION
Status: DISCONTINUED | OUTPATIENT
Start: 2019-10-06 | End: 2019-10-10 | Stop reason: HOSPADM

## 2019-10-06 RX ORDER — GUAIFENESIN 100 MG/5ML
200 SOLUTION ORAL EVERY 4 HOURS PRN
Status: DISCONTINUED | OUTPATIENT
Start: 2019-10-06 | End: 2019-10-10 | Stop reason: HOSPADM

## 2019-10-06 RX ORDER — BENZONATATE 100 MG/1
100 CAPSULE ORAL 3 TIMES DAILY PRN
Status: DISCONTINUED | OUTPATIENT
Start: 2019-10-06 | End: 2019-10-10 | Stop reason: HOSPADM

## 2019-10-06 RX ORDER — IBUPROFEN 200 MG
16 TABLET ORAL
Status: DISCONTINUED | OUTPATIENT
Start: 2019-10-06 | End: 2019-10-10 | Stop reason: HOSPADM

## 2019-10-06 RX ORDER — AZITHROMYCIN 250 MG/1
1000 TABLET, FILM COATED ORAL
Status: COMPLETED | OUTPATIENT
Start: 2019-10-06 | End: 2019-10-06

## 2019-10-06 RX ORDER — KETOROLAC TROMETHAMINE 30 MG/ML
10 INJECTION, SOLUTION INTRAMUSCULAR; INTRAVENOUS
Status: COMPLETED | OUTPATIENT
Start: 2019-10-06 | End: 2019-10-06

## 2019-10-06 RX ORDER — GLUCAGON 1 MG
1 KIT INJECTION
Status: DISCONTINUED | OUTPATIENT
Start: 2019-10-06 | End: 2019-10-10 | Stop reason: HOSPADM

## 2019-10-06 RX ORDER — IBUPROFEN 200 MG
24 TABLET ORAL
Status: DISCONTINUED | OUTPATIENT
Start: 2019-10-06 | End: 2019-10-10 | Stop reason: HOSPADM

## 2019-10-06 RX ORDER — ACETAMINOPHEN 500 MG
1000 TABLET ORAL
Status: COMPLETED | OUTPATIENT
Start: 2019-10-06 | End: 2019-10-06

## 2019-10-06 RX ORDER — INSULIN ASPART 100 [IU]/ML
1-10 INJECTION, SOLUTION INTRAVENOUS; SUBCUTANEOUS
Status: DISCONTINUED | OUTPATIENT
Start: 2019-10-06 | End: 2019-10-10 | Stop reason: HOSPADM

## 2019-10-06 RX ADMIN — ACETAMINOPHEN 1000 MG: 500 TABLET ORAL at 06:10

## 2019-10-06 RX ADMIN — CEFTRIAXONE 2 G: 2 INJECTION, SOLUTION INTRAVENOUS at 07:10

## 2019-10-06 RX ADMIN — SODIUM CHLORIDE 1000 ML: 0.9 INJECTION, SOLUTION INTRAVENOUS at 06:10

## 2019-10-06 RX ADMIN — KETOROLAC TROMETHAMINE 10 MG: 30 INJECTION, SOLUTION INTRAMUSCULAR; INTRAVENOUS at 06:10

## 2019-10-06 RX ADMIN — SODIUM CHLORIDE 1500 ML: 0.9 INJECTION, SOLUTION INTRAVENOUS at 07:10

## 2019-10-06 RX ADMIN — AZITHROMYCIN MONOHYDRATE 1000 MG: 250 TABLET ORAL at 07:10

## 2019-10-06 NOTE — ED NOTES
2 Patient identifiers, allergies, and medications verified.    LOC: Patient is awake, drowsy, weak, but oriented x3. Pt aware of environment with an appropriate affect and speaking appropriate.    APPEARANCE: Patient in discomfort with complaints of body aches, patient is clean and well groomed; clothing is properly fastened.    SKIN: fever present, normal skin turgor and moist mucus membranes; no rashes or lesions present. Skin Intact, No breakdown noted.    Musculoskeletal:  Patient complains of weakness and body aches    RESPIRATORY: Airway is open and patent, unlabored, spontaneous bilateral respirations spontaneous; normal effort and rate.     CARDIAC: tachycardic    PULSES: 2+; symmetrical in all 4 extremities    NEUROLOGIC: PERRLA, symmetrical facial expression; normal sensation to all 4 extremities.          Call light within reach, continuous monitoring in place, remains in stretcher at 45 degree angle and wheels locked, VSS, will continue to monitor.       Fiance at bedside

## 2019-10-06 NOTE — ED TRIAGE NOTES
Patient reported to ED with complaints of weakness throughout body, headache, hyperglycemia (430 upon arrival), and nausea. Patient transported to ED room via wheelchair due to weakness. Patient tachycardic (120s), with 102.7 temperature. Patient remains in stretcher with wheels locked. 12 lead performed. EKG obtained. Cultures and nasal swab sent to lab. AAOx3, VSS, will continue to monitor closely, continuous monitoring in place.

## 2019-10-07 LAB
ANION GAP SERPL CALC-SCNC: 11 MMOL/L (ref 8–16)
ANISOCYTOSIS BLD QL SMEAR: SLIGHT
BASOPHILS # BLD AUTO: ABNORMAL K/UL (ref 0–0.2)
BASOPHILS NFR BLD: 0 % (ref 0–1.9)
BUN SERPL-MCNC: 6 MG/DL (ref 6–20)
CALCIUM SERPL-MCNC: 8.7 MG/DL (ref 8.7–10.5)
CHLORIDE SERPL-SCNC: 101 MMOL/L (ref 95–110)
CO2 SERPL-SCNC: 20 MMOL/L (ref 23–29)
CREAT SERPL-MCNC: 0.9 MG/DL (ref 0.5–1.4)
DIFFERENTIAL METHOD: ABNORMAL
EOSINOPHIL # BLD AUTO: ABNORMAL K/UL (ref 0–0.5)
EOSINOPHIL NFR BLD: 0 % (ref 0–8)
ERYTHROCYTE [DISTWIDTH] IN BLOOD BY AUTOMATED COUNT: 13.5 % (ref 11.5–14.5)
EST. GFR  (AFRICAN AMERICAN): >60 ML/MIN/1.73 M^2
EST. GFR  (NON AFRICAN AMERICAN): >60 ML/MIN/1.73 M^2
ESTIMATED AVG GLUCOSE: 355 MG/DL (ref 68–131)
GLUCOSE SERPL-MCNC: 391 MG/DL (ref 70–110)
HBA1C MFR BLD HPLC: 14 % (ref 4–5.6)
HCT VFR BLD AUTO: 35.7 % (ref 37–48.5)
HGB BLD-MCNC: 11.4 G/DL (ref 12–16)
HYPOCHROMIA BLD QL SMEAR: ABNORMAL
IMM GRANULOCYTES # BLD AUTO: ABNORMAL K/UL (ref 0–0.04)
IMM GRANULOCYTES NFR BLD AUTO: ABNORMAL % (ref 0–0.5)
LACTATE SERPL-SCNC: 1.8 MMOL/L (ref 0.5–2.2)
LACTATE SERPL-SCNC: 3.9 MMOL/L (ref 0.5–2.2)
LYMPHOCYTES # BLD AUTO: ABNORMAL K/UL (ref 1–4.8)
LYMPHOCYTES NFR BLD: 10 % (ref 18–48)
MCH RBC QN AUTO: 26.5 PG (ref 27–31)
MCHC RBC AUTO-ENTMCNC: 31.9 G/DL (ref 32–36)
MCV RBC AUTO: 83 FL (ref 82–98)
MONOCYTES # BLD AUTO: ABNORMAL K/UL (ref 0.3–1)
MONOCYTES NFR BLD: 5 % (ref 4–15)
NEUTROPHILS NFR BLD: 81 % (ref 38–73)
NEUTS BAND NFR BLD MANUAL: 4 %
NRBC BLD-RTO: 0 /100 WBC
PLATELET # BLD AUTO: 229 K/UL (ref 150–350)
PMV BLD AUTO: 10.4 FL (ref 9.2–12.9)
POCT GLUCOSE: 302 MG/DL (ref 70–110)
POCT GLUCOSE: 324 MG/DL (ref 70–110)
POCT GLUCOSE: 336 MG/DL (ref 70–110)
POCT GLUCOSE: 337 MG/DL (ref 70–110)
POCT GLUCOSE: 345 MG/DL (ref 70–110)
POCT GLUCOSE: 372 MG/DL (ref 70–110)
POTASSIUM SERPL-SCNC: 4.6 MMOL/L (ref 3.5–5.1)
RBC # BLD AUTO: 4.31 M/UL (ref 4–5.4)
SODIUM SERPL-SCNC: 132 MMOL/L (ref 136–145)
TOXIC GRANULES BLD QL SMEAR: PRESENT
WBC # BLD AUTO: 9.38 K/UL (ref 3.9–12.7)

## 2019-10-07 PROCEDURE — 25000003 PHARM REV CODE 250: Performed by: PHYSICIAN ASSISTANT

## 2019-10-07 PROCEDURE — 85027 COMPLETE CBC AUTOMATED: CPT

## 2019-10-07 PROCEDURE — C9399 UNCLASSIFIED DRUGS OR BIOLOG: HCPCS | Performed by: PHYSICIAN ASSISTANT

## 2019-10-07 PROCEDURE — 87205 SMEAR GRAM STAIN: CPT

## 2019-10-07 PROCEDURE — 83605 ASSAY OF LACTIC ACID: CPT | Mod: 91

## 2019-10-07 PROCEDURE — 87070 CULTURE OTHR SPECIMN AEROBIC: CPT

## 2019-10-07 PROCEDURE — 87186 SC STD MICRODIL/AGAR DIL: CPT

## 2019-10-07 PROCEDURE — 80048 BASIC METABOLIC PNL TOTAL CA: CPT

## 2019-10-07 PROCEDURE — 11000001 HC ACUTE MED/SURG PRIVATE ROOM

## 2019-10-07 PROCEDURE — C9399 UNCLASSIFIED DRUGS OR BIOLOG: HCPCS | Performed by: HOSPITALIST

## 2019-10-07 PROCEDURE — 25000003 PHARM REV CODE 250: Performed by: HOSPITALIST

## 2019-10-07 PROCEDURE — 63600175 PHARM REV CODE 636 W HCPCS: Performed by: HOSPITALIST

## 2019-10-07 PROCEDURE — 87077 CULTURE AEROBIC IDENTIFY: CPT

## 2019-10-07 PROCEDURE — 94761 N-INVAS EAR/PLS OXIMETRY MLT: CPT

## 2019-10-07 PROCEDURE — 85007 BL SMEAR W/DIFF WBC COUNT: CPT

## 2019-10-07 PROCEDURE — 36415 COLL VENOUS BLD VENIPUNCTURE: CPT

## 2019-10-07 PROCEDURE — 63600175 PHARM REV CODE 636 W HCPCS: Performed by: PHYSICIAN ASSISTANT

## 2019-10-07 PROCEDURE — 87632 RESP VIRUS 6-11 TARGETS: CPT

## 2019-10-07 PROCEDURE — 83605 ASSAY OF LACTIC ACID: CPT

## 2019-10-07 RX ORDER — ONDANSETRON 2 MG/ML
4 INJECTION INTRAMUSCULAR; INTRAVENOUS EVERY 6 HOURS PRN
Status: DISCONTINUED | OUTPATIENT
Start: 2019-10-07 | End: 2019-10-10 | Stop reason: HOSPADM

## 2019-10-07 RX ORDER — ACETAMINOPHEN 325 MG/1
650 TABLET ORAL EVERY 6 HOURS PRN
Status: DISCONTINUED | OUTPATIENT
Start: 2019-10-07 | End: 2019-10-08

## 2019-10-07 RX ORDER — TRAMADOL HYDROCHLORIDE 50 MG/1
50 TABLET ORAL EVERY 6 HOURS PRN
Status: DISCONTINUED | OUTPATIENT
Start: 2019-10-07 | End: 2019-10-07

## 2019-10-07 RX ORDER — SODIUM CHLORIDE 9 MG/ML
INJECTION, SOLUTION INTRAVENOUS CONTINUOUS
Status: DISCONTINUED | OUTPATIENT
Start: 2019-10-07 | End: 2019-10-10 | Stop reason: HOSPADM

## 2019-10-07 RX ORDER — IBUPROFEN 600 MG/1
600 TABLET ORAL ONCE
Status: COMPLETED | OUTPATIENT
Start: 2019-10-07 | End: 2019-10-07

## 2019-10-07 RX ORDER — ENOXAPARIN SODIUM 100 MG/ML
40 INJECTION SUBCUTANEOUS EVERY 24 HOURS
Status: DISCONTINUED | OUTPATIENT
Start: 2019-10-07 | End: 2019-10-10 | Stop reason: HOSPADM

## 2019-10-07 RX ORDER — TRAMADOL HYDROCHLORIDE 50 MG/1
50 TABLET ORAL ONCE
Status: COMPLETED | OUTPATIENT
Start: 2019-10-07 | End: 2019-10-07

## 2019-10-07 RX ORDER — ACETAMINOPHEN 500 MG
1000 TABLET ORAL ONCE
Status: COMPLETED | OUTPATIENT
Start: 2019-10-07 | End: 2019-10-07

## 2019-10-07 RX ADMIN — ENOXAPARIN SODIUM 40 MG: 100 INJECTION SUBCUTANEOUS at 05:10

## 2019-10-07 RX ADMIN — INSULIN ASPART 5 UNITS: 100 INJECTION, SOLUTION INTRAVENOUS; SUBCUTANEOUS at 10:10

## 2019-10-07 RX ADMIN — ACETAMINOPHEN 650 MG: 325 TABLET, FILM COATED ORAL at 12:10

## 2019-10-07 RX ADMIN — ACETAMINOPHEN 1000 MG: 500 TABLET, FILM COATED ORAL at 01:10

## 2019-10-07 RX ADMIN — INSULIN ASPART 8 UNITS: 100 INJECTION, SOLUTION INTRAVENOUS; SUBCUTANEOUS at 05:10

## 2019-10-07 RX ADMIN — SODIUM CHLORIDE: 0.9 INJECTION, SOLUTION INTRAVENOUS at 01:10

## 2019-10-07 RX ADMIN — CEFTRIAXONE 1 G: 1 INJECTION, SOLUTION INTRAVENOUS at 09:10

## 2019-10-07 RX ADMIN — INSULIN ASPART 10 UNITS: 100 INJECTION, SOLUTION INTRAVENOUS; SUBCUTANEOUS at 09:10

## 2019-10-07 RX ADMIN — BENZONATATE 100 MG: 100 CAPSULE ORAL at 08:10

## 2019-10-07 RX ADMIN — TRAMADOL HYDROCHLORIDE 50 MG: 50 TABLET, FILM COATED ORAL at 10:10

## 2019-10-07 RX ADMIN — INSULIN ASPART 4 UNITS: 100 INJECTION, SOLUTION INTRAVENOUS; SUBCUTANEOUS at 01:10

## 2019-10-07 RX ADMIN — IBUPROFEN 600 MG: 600 TABLET, FILM COATED ORAL at 03:10

## 2019-10-07 RX ADMIN — INSULIN DETEMIR 21 UNITS: 100 INJECTION, SOLUTION SUBCUTANEOUS at 12:10

## 2019-10-07 RX ADMIN — GUAIFENESIN 200 MG: 100 SOLUTION ORAL at 10:10

## 2019-10-07 RX ADMIN — ONDANSETRON 4 MG: 2 INJECTION INTRAMUSCULAR; INTRAVENOUS at 01:10

## 2019-10-07 RX ADMIN — INSULIN ASPART 8 UNITS: 100 INJECTION, SOLUTION INTRAVENOUS; SUBCUTANEOUS at 12:10

## 2019-10-07 RX ADMIN — ACETAMINOPHEN 650 MG: 325 TABLET, FILM COATED ORAL at 08:10

## 2019-10-07 RX ADMIN — INSULIN DETEMIR 30 UNITS: 100 INJECTION, SOLUTION SUBCUTANEOUS at 10:10

## 2019-10-07 RX ADMIN — AZITHROMYCIN MONOHYDRATE 500 MG: 500 INJECTION, POWDER, LYOPHILIZED, FOR SOLUTION INTRAVENOUS at 10:10

## 2019-10-07 NOTE — ED PROVIDER NOTES
Encounter Date: 10/6/2019       History     Chief Complaint   Patient presents with    Chills     Pt complains of being febrile with generalized body aches x 2 days. Flu like symptoms    Fever     Patient is a 38-year-old female with history of diabetes and hypertension who presents to the emergency department with body aches and chills.  Patient states over the last 24 hr she has not been feeling well. She reports cough with no sputum production.  She denies chest pain or shortness of breath. She states she is having chills that are so severe that it hurts all of her bones and joints. She reports muscle aches.  She reports nausea with no vomiting.  She states she has lower back pain bilaterally that radiates into both hips.  She denies dysuria.  She denies vomiting or diarrhea.  She states she has not been compliant with hypertension medications or diabetic medications because of insurance reasons.  She denies any blood per rectum.  She denies any recent travel.  She denies any recent ingestion of raw meat or raw fish.  She denies recent hospitalization.  She denies recent antibiotics.    The history is provided by the patient.     Review of patient's allergies indicates:   Allergen Reactions    Asa [aspirin]      Tongue lump up and nauseated     Past Medical History:   Diagnosis Date    Diabetes mellitus     Hypertension      Past Surgical History:   Procedure Laterality Date    ANKLE SURGERY Bilateral      SECTION      HYSTERECTOMY       No family history on file.  Social History     Tobacco Use    Smoking status: Former Smoker    Smokeless tobacco: Never Used   Substance Use Topics    Alcohol use: Yes     Comment: socially    Drug use: No     Review of Systems   Constitutional: Positive for activity change, appetite change, chills, fatigue and fever.   HENT: Negative for congestion, ear discharge, ear pain, postnasal drip, rhinorrhea, sore throat and trouble swallowing.    Respiratory:  Positive for cough.    Cardiovascular: Negative for chest pain, palpitations and leg swelling.   Gastrointestinal: Positive for nausea. Negative for abdominal pain, blood in stool, constipation, diarrhea and vomiting.   Genitourinary: Positive for flank pain. Negative for dysuria and hematuria.   Musculoskeletal: Positive for arthralgias, back pain and myalgias. Negative for neck pain and neck stiffness.   Skin: Negative for rash and wound.   Neurological: Positive for weakness (generalized). Negative for dizziness, light-headedness and headaches.       Physical Exam     Initial Vitals [10/06/19 1652]   BP Pulse Resp Temp SpO2   134/75 (!) 118 (!) 23 (!) 102.7 °F (39.3 °C) 97 %      MAP       --         Physical Exam    Nursing note and vitals reviewed.  Constitutional: She appears well-developed and well-nourished. She is not diaphoretic. She is Obese .  Non-toxic appearance. She has a sickly appearance. She appears ill. No distress.   HENT:   Head: Normocephalic.   Right Ear: Hearing and external ear normal.   Left Ear: Hearing and external ear normal.   Nose: Nose normal.   Mouth/Throat: Uvula is midline and oropharynx is clear and moist. Mucous membranes are dry. No oropharyngeal exudate.   Eyes: Conjunctivae are normal. Pupils are equal, round, and reactive to light.   Neck: Normal range of motion and full passive range of motion without pain. Neck supple. No neck rigidity.   Cardiovascular: Regular rhythm.   tachycardic   Pulmonary/Chest: Breath sounds normal. No respiratory distress. She has no wheezes. She has no rhonchi. She has no rales. She exhibits no tenderness.   Abdominal: Soft. Bowel sounds are normal. She exhibits no distension and no mass. There is no tenderness. There is CVA tenderness (bilateral). There is no rebound and no guarding.   Lymphadenopathy:     She has no cervical adenopathy.   Neurological: She is alert and oriented to person, place, and time.   Skin: Skin is warm and dry. Capillary  refill takes less than 2 seconds.   Psychiatric: She has a normal mood and affect.         ED Course   Critical Care  Date/Time: 10/6/2019 7:45 PM  Performed by: Radha Brooks PA-C  Authorized by: Tramaine Morgan MD   Direct patient critical care time: 15 minutes  Ordering / reviewing critical care time: 5 minutes  Documentation critical care time: 5 minutes  Consulting other physicians critical care time: 5 minutes  Total critical care time (exclusive of procedural time) : 30 minutes  Critical care was necessary to treat or prevent imminent or life-threatening deterioration of the following conditions: sepsis, shock, endocrine crisis and dehydration.  Critical care was time spent personally by me on the following activities: blood draw for specimens, development of treatment plan with patient or surrogate, discussions with consultants, evaluation of patient's response to treatment, examination of patient, obtaining history from patient or surrogate, ordering and performing treatments and interventions, ordering and review of laboratory studies, pulse oximetry and review of old charts.  Subsequent provider of critical care: I assumed direction of critical care for this patient from another provider of my specialty.        Labs Reviewed   URINALYSIS, REFLEX TO URINE CULTURE - Abnormal; Notable for the following components:       Result Value    Appearance, UA Hazy (*)     Specific Gravity, UA <=1.005 (*)     Protein, UA Trace (*)     Glucose, UA 3+ (*)     Occult Blood UA 1+ (*)     Leukocytes, UA Trace (*)     All other components within normal limits    Narrative:     Preferred Collection Type->Urine, Clean Catch   CBC W/ AUTO DIFFERENTIAL - Abnormal; Notable for the following components:    Mean Corpuscular Hemoglobin 26.2 (*)     Immature Granulocytes 0.6 (*)     Immature Grans (Abs) 0.05 (*)     Gran% 77.4 (*)     Lymph% 13.9 (*)     All other components within normal limits   COMPREHENSIVE  METABOLIC PANEL - Abnormal; Notable for the following components:    Sodium 131 (*)     CO2 22 (*)     Glucose 398 (*)     Albumin 3.1 (*)     All other components within normal limits   LACTIC ACID, PLASMA - Abnormal; Notable for the following components:    Lactate (Lactic Acid) 4.1 (*)     All other components within normal limits    Narrative:     Lactic Acid critical result(s) called and verbal readback obtained   from  Tere Summers RN/ER., 10/06/2019 18:46   LACTIC ACID, PLASMA - Abnormal; Notable for the following components:    Lactate (Lactic Acid) 4.1 (*)     All other components within normal limits    Narrative:      Lactic Acid  critical result(s) called and verbal readback obtained   from Tere Summers RN/ER., 10/06/2019 18:43   URINALYSIS, REFLEX TO URINE CULTURE - Abnormal; Notable for the following components:    Appearance, UA Hazy (*)     Specific Gravity, UA <=1.005 (*)     Protein, UA Trace (*)     Glucose, UA 3+ (*)     Occult Blood UA 1+ (*)     Leukocytes, UA Trace (*)     All other components within normal limits    Narrative:     Preferred Collection Type->Urine, Clean Catch   URINALYSIS MICROSCOPIC - Abnormal; Notable for the following components:    WBC, UA 15 (*)     WBC Clumps, UA Occasional (*)     Bacteria Few (*)     All other components within normal limits    Narrative:     Preferred Collection Type->Urine, Clean Catch   POCT GLUCOSE - Abnormal; Notable for the following components:    POCT Glucose 430 (*)     All other components within normal limits   ISTAT PROCEDURE - Abnormal; Notable for the following components:    POC PO2 21 (*)     POC HCO3 23.8 (*)     POC SATURATED O2 34 (*)     All other components within normal limits   POCT GLUCOSE - Abnormal; Notable for the following components:    POCT Glucose 380 (*)     All other components within normal limits   INFLUENZA A & B BY MOLECULAR   CULTURE, BLOOD   CULTURE, BLOOD   CULTURE, URINE   BETA - HYDROXYBUTYRATE, SERUM   POCT  URINE PREGNANCY   POCT GLUCOSE MONITORING CONTINUOUS          Imaging Results          X-Ray Chest AP Portable (Final result)  Result time 10/06/19 18:20:13    Final result by Jaciel Espitia MD (10/06/19 18:20:13)                 Impression:      Airspace opacity in the right lung base, consistent with pneumonia.      Electronically signed by: Jaciel Espitia MD  Date:    10/06/2019  Time:    18:20             Narrative:    EXAMINATION:  XR CHEST AP PORTABLE    CLINICAL HISTORY:  Sepsis;    TECHNIQUE:  Single frontal view of the chest was performed.    COMPARISON:  12/18/2018.    FINDINGS:  Monitoring EKG leads are present.  The trachea is unremarkable.  The cardiac silhouette is upper limits of normal on this AP projection.  There is elevation of both hemidiaphragms.  There are no pleural effusions.  There is no evidence of a pneumothorax.  There is no evidence of pneumomediastinum.  There is an airspace opacity in the right lung base.  The osseous structures are unremarkable.                              X-Rays:   Independently Interpreted Readings:   Other Readings:  Right lower lung opacification    Medical Decision Making:   Initial Assessment:   Urgent evaluation of a 38-year-old female with history of hypertension and diabetes who presents to the emergency department with fevers and chills. Patient is febrile and tachycardic on initial exam.  She is ill-appearing.  Benign abdominal exam.  No nuchal rigidity.  Lungs are clear to auscultation.  Dry mucous membranes.  Glucose is significantly elevated.  Patient meets SIRS criteria.  Sepsis workup in DKA workup will be performed.  Clinical Tests:   Lab Tests: Ordered and Reviewed  Radiological Study: Ordered and Reviewed  ED Management:  1845  Labs reveal no leukocytosis.  No kidney insufficiency or electrolyte disturbance.  No gap.  PH is normal.  No evidence of DKA.  Lactic acid is elevated.      7:23 PM  Source is found.  UTI and pneumonia.  Pt will be given  rocephin and azithromycin at this time.  On repeat exam, pt does have bilateral CVA tenderness.  Will discuss case with hospital medicine and case management.    7:37 PM                        Clinical Impression:       ICD-10-CM ICD-9-CM   1. Pneumonia of right lower lobe due to infectious organism J18.1 486   2. Tachycardia R00.0 785.0   3. Pyelonephritis N12 590.80                                Radha Brooks PA-C  10/06/19 1946

## 2019-10-07 NOTE — ASSESSMENT & PLAN NOTE
- hyperglycemic w/ glycosuria at time of admission   - no DKA   - states she is non-compliant with meds   - A1C pending for AM   - Diabetic diet   - SSI with accuchekcs AC/HS

## 2019-10-07 NOTE — PLAN OF CARE
Discharge Planning:  Patient admitted on 10-6-19  LOS-day 1  Chart reviewed, Care plan discussed with pt  Discussed care plan with treatment team,  attending Dr Robles  Consults following are: case mgt  PCP updated in [facility]: yes  Pharmacy, updated in [facility]: [facility] [address]  Insurance: pending (states her work ([email or web address]) is eligible next month, technically she is [place] pending   DME at home: glucometer  Current dispo: pending, see H and P  Transportation: yes  Case management to follow        10/07/19 1332   Discharge Assessment   Assessment Type Discharge Planning Assessment   Confirmed/corrected address and phone number on facesheet? Yes   Assessment information obtained from? Patient;Caregiver;Medical Record   Communicated expected length of stay with patient/caregiver yes   Prior to hospitilization cognitive status: Alert/Oriented   Prior to hospitalization functional status: Independent   Current cognitive status: Alert/Oriented   Current Functional Status: Independent   Lives With child(von), adult   Able to Return to Prior Arrangements yes   Is patient able to care for self after discharge? Yes   Patient currently being followed by outpatient case management? No   Patient currently receives any other outside agency services? No   Equipment Currently Used at Home glucometer   Do you have any problems affording any of your prescribed medications? No   Is the patient taking medications as prescribed? yes   Does the patient have transportation home? Yes   Transportation Anticipated family or friend will provide   Discharge Plan A Home   DME Needed Upon Discharge  other (see comments)  (b/p machine/cuff)   Patient/Family in Agreement with Plan yes

## 2019-10-07 NOTE — PLAN OF CARE
Patient is alert and oriented x 4. Pt is  repositions self and ambulating independently. Pt becomes afebrile this shift and temp reported to Amrailys Murillo. Ibuprofen is ordered along with placing ice to arms and groin. Decrease of temp is noted.Pt is voidina accurately up to toilet. Drainage is clear and yellow. Pt has one episode of vomiting this shift and intermittent nausea. Zofran is given and relief is noted.  at bedtime. Pt is covered with 4 units of Aspart. Pt remains free from falls and injury this shift. All needs and concerns are met. Hourly rounding is done. Bed is locked and in lowest position, side rails up x2, call light in reach. Will continue to monitor.

## 2019-10-07 NOTE — ASSESSMENT & PLAN NOTE
- Ms. Car Barrios is admitted to inpatient status   - she has a RLL PNA   - PNA studies ordered   - PORT score: was 59 at worst,  39 at time of admission  - procalcitonin pending  - continue CAP treatment w/ rocephin & azithromycin  - PRN cough meds ordered   - monitor

## 2019-10-07 NOTE — PLAN OF CARE
Patient is awake, alert, and oriented.  Purposeful, hourly roundings done.  Vital signs WNL. Afebrile.  Pain managed with current PO regimen.   Pt c/o headaches earlier in shift. One time dose of 1g tylenol was given which eased the pain.  Peripheral IV intact.  Pt ambulated in room without difficulties and position self in bed independently.  Tolerating diet well. No nausea or vomiting.  Accuchecks ac/hs. Remains elevated throughout shift (high 300s).  Voids spontaneously.  Reviewed plan of care with patient. Reassurance was provided.  Call light within reach.  Continue monitoring.

## 2019-10-07 NOTE — H&P
[facility] Hospital Medicine  History & Physical    Patient Name: Car Barrios  MRN: [id number]  Admission Date: 10/6/2019  Attending Physician: Kem Robles MD   Primary Care Provider: Primary Doctor No         Patient information was obtained from patient, past medical records and ER records.     Subjective:     Principal Problem:Pneumonia of right lower lobe due to infectious organism    Chief Complaint:   Chief Complaint   Patient presents with    Chills     Pt complains of being febrile with generalized body aches x 2 days. Flu like symptoms    Fever        HPI: Ms. Car Barrios is a 38 y.o. female, with PMH of IDDM-2, HTN, who presented to [facility] ED on 10/6/19 2/2 chills and body aches x 2 days. She notes associated non-productive cough, myalgias, nausea, low back pain. She denies chest pain, SOB, V/D, dysuria. She endorses medication non-compliance 2/2 insurance issues. Upon presentation she was found to be febrile up to 102.7.F PO. She was evaluated in the ED, and noted to have elevated lactic acid, hyperglycemia without open anion gap, and normal beta-hydroxybutyrate, and UTI. She was treated with rocephin/azithromycin and admitted to inpatient status.     Past Medical History:   Diagnosis Date    Diabetes mellitus     Hypertension        Past Surgical History:   Procedure Laterality Date    ANKLE SURGERY Bilateral      SECTION      HYSTERECTOMY         Review of patient's allergies indicates:   Allergen Reactions    Asa [aspirin]      Tongue lump up and nauseated       No current facility-administered medications on file prior to encounter.      Current Outpatient Medications on File Prior to Encounter   Medication Sig    fluticasone propionate (FLONASE) 50 mcg/actuation nasal spray 1 spray (50 mcg total) by Each Nostril route 2 (two) times daily.    insulin glargine (BASAGLAR KWIKPEN U-100 INSULIN) 100 unit/mL (3 mL) InPn pen Inject 30 Units into the skin every  evening.    triamcinolone acetonide 0.025% (KENALOG) 0.025 % cream Apply topically 2 (two) times daily.     Family History     None        Tobacco Use    Smoking status: Never Smoker    Smokeless tobacco: Never Used   Substance and Sexual Activity    Alcohol use: Yes     Comment: socially    Drug use: No    Sexual activity: Yes     Partners: Male     Review of Systems   Constitutional: Positive for diaphoresis. Negative for appetite change, chills, fatigue and fever.   HENT: Negative for congestion, ear pain, postnasal drip, rhinorrhea, sinus pressure, sore throat and trouble swallowing.    Respiratory: Positive for cough. Negative for shortness of breath and wheezing.    Cardiovascular: Negative for chest pain and palpitations.   Gastrointestinal: Negative for abdominal pain, diarrhea, nausea and vomiting.   Genitourinary: Negative for dysuria, flank pain, frequency, hematuria and urgency.   Musculoskeletal: Positive for back pain. Negative for arthralgias, myalgias, neck pain and neck stiffness.   Skin: Negative for color change and pallor.   Neurological: Negative for dizziness, syncope, weakness, light-headedness, numbness and headaches.   Psychiatric/Behavioral: Negative for confusion and decreased concentration.     Objective:     Vital Signs (Most Recent):  Temp: (!) 100.8 °F (38.2 °C) (10/07/19 0329)  Pulse: (!) 113 (10/07/19 0329)  Resp: 20 (10/07/19 0329)  BP: (!) 111/56 (10/07/19 0329)  SpO2: (!) 93 % (10/07/19 0329) Vital Signs (24h Range):  Temp:  [99.3 °F (37.4 °C)-102.7 °F (39.3 °C)] 100.8 °F (38.2 °C)  Pulse:  [] 113  Resp:  [13-34] 20  SpO2:  [93 %-97 %] 93 %  BP: (111-147)/(56-82) 111/56     Weight: 107 kg (236 lb)  Body mass index is 40.51 kg/m².    Physical Exam   Constitutional: She is oriented to person, place, and time. She appears well-developed and well-nourished. No distress.   HENT:   Head: Normocephalic and atraumatic.   Eyes: Pupils are equal, round, and reactive to light.  Conjunctivae and EOM are normal. No scleral icterus.   Neck: Normal range of motion. Neck supple. No tracheal deviation present.   Cardiovascular: Normal rate, regular rhythm, normal heart sounds and intact distal pulses. Exam reveals no gallop and no friction rub.   No murmur heard.  Pulmonary/Chest: Effort normal and breath sounds normal. No stridor. No respiratory distress. She has no wheezes. She has no rales.   Abdominal: Soft. Bowel sounds are normal. She exhibits no distension. There is no tenderness. There is no guarding.   Neurological: She is alert and oriented to person, place, and time.   Skin: Skin is warm and dry. She is not diaphoretic. No erythema. No pallor.   Psychiatric: She has a normal mood and affect. Her behavior is normal. Judgment and thought content normal.   Nursing note and vitals reviewed.        CRANIAL NERVES     CN III, IV, VI   Pupils are equal, round, and reactive to light.  Extraocular motions are normal.        Significant Labs:   BMP:   Recent Labs   Lab 10/06/19  2149 10/07/19  0210   * 391*   * 132*   K 4.0 4.6    101   CO2 22* 20*   BUN 6 6   CREATININE 0.8 0.9   CALCIUM 8.5* 8.7   MG 1.6  --      CBC:   Recent Labs   Lab 10/06/19  1737 10/07/19  0210   WBC 8.41 9.38   HGB 12.9 11.4*   HCT 40.3 35.7*    229     CMP:   Recent Labs   Lab 10/06/19  1737 10/06/19  2149 10/07/19  0210   * 133* 132*   K 4.6 4.0 4.6   CL 97 101 101   CO2 22* 22* 20*   * 326* 391*   BUN 6 6 6   CREATININE 1.0 0.8 0.9   CALCIUM 9.7 8.5* 8.7   PROT 7.9  --   --    ALBUMIN 3.1*  --   --    BILITOT 0.6  --   --    ALKPHOS 109  --   --    AST 28  --   --    ALT 27  --   --    ANIONGAP 12 10 11   EGFRNONAA >60 >60 >60     Urine Culture: No results for input(s): LABURIN in the last 48 hours.  Urine Studies:   Recent Labs   Lab 10/06/19  1740   COLORU Yellow  Yellow   APPEARANCEUA Hazy*  Hazy*   PHUR 6.0  6.0   SPECGRAV <=1.005*  <=1.005*   PROTEINUA Trace*  Trace*    GLUCUA 3+*  3+*   KETONESU Negative  Negative   BILIRUBINUA Negative  Negative   OCCULTUA 1+*  1+*   NITRITE Negative  Negative   UROBILINOGEN Negative  Negative   LEUKOCYTESUR Trace*  Trace*   RBCUA 4   WBCUA 15*   BACTERIA Few*   SQUAMEPITHEL 6     All pertinent labs within the past 24 hours have been reviewed.    Significant Imaging: I have reviewed all pertinent imaging results/findings within the past 24 hours.   Imaging Results          X-Ray Chest AP Portable (Final result)  Result time 10/06/19 18:20:13    Final result by Jaciel Espitia MD (10/06/19 18:20:13)                 Impression:      Airspace opacity in the right lung base, consistent with pneumonia.      Electronically signed by: Jaciel Espitia MD  Date:    10/06/2019  Time:    18:20             Narrative:    EXAMINATION:  XR CHEST AP PORTABLE    CLINICAL HISTORY:  Sepsis;    TECHNIQUE:  Single frontal view of the chest was performed.    COMPARISON:  12/18/2018.    FINDINGS:  Monitoring EKG leads are present.  The trachea is unremarkable.  The cardiac silhouette is upper limits of normal on this AP projection.  There is elevation of both hemidiaphragms.  There are no pleural effusions.  There is no evidence of a pneumothorax.  There is no evidence of pneumomediastinum.  There is an airspace opacity in the right lung base.  The osseous structures are unremarkable.                                 Assessment/Plan:     * Pneumonia of right lower lobe due to infectious organism  - Ms. Car Barrios is admitted to inpatient status   - she has a RLL PNA   - PNA studies ordered   - PORT score: was 59 at worst,  39 at time of admission  - procalcitonin pending  - continue CAP treatment w/ rocephin & azithromycin  - PRN cough meds ordered   - monitor    UTI (urinary tract infection)  - UA shows UTI  - cultures pending   - treatment w/ rocephin already started       Type 2 diabetes mellitus with hyperglycemia, with long-term current use of insulin  -  hyperglycemic w/ glycosuria at time of admission   - no DKA   - states she is non-compliant with meds   - A1C pending for AM   - Diabetic diet   - SSI with accuchekcs AC/HS       Essential hypertension  - states she is non-compliant with meds   - normo tensive throughout stay thus far  - monitor     VTE Risk Mitigation (From admission, onward)         Ordered     enoxaparin injection 40 mg  Daily      10/07/19 0658     IP VTE HIGH RISK PATIENT  Once      10/07/19 0658     Place sequential compression device  Until discontinued      10/07/19 0658                   Amarilys Murillo PA-C  Department of Hospital Medicine   [facility]

## 2019-10-07 NOTE — ED NOTES
Received report, pt lying in bed, respirations even, unlabored, eyes open spontaneously, NAD noted, family member at bedside. Will continue to monitor, pt updated on plan of care

## 2019-10-07 NOTE — HPI
"Per H&P by MAXINE Armstrong:  "Ms. Car Barrios is a 38 y.o. female, with PMH of IDDM-2, HTN, who presented to [facility] ED on 10/6/19 2/2 chills and body aches x 2 days. She notes associated non-productive cough, myalgias, nausea, low back pain. She denies chest pain, SOB, V/D, dysuria. She endorses medication non-compliance 2/2 insurance issues. Upon presentation she was found to be febrile up to 102.7.F. She was evaluated in the ED, and noted to have elevated lactic acid, hyperglycemia without open anion gap, and normal beta-hydroxybutyrate, and UTI. She was treated with rocephin/azithromycin and admitted to inpatient status."   "

## 2019-10-07 NOTE — PROGRESS NOTES
Pt arrived to floor via wheelchair with PCT and transferred to bed. Alert and oriented x4. Pt oriented to room, call light placed within reach, bed low and locked, and spouse at bedside. No complaints of pain at present. No acute distress noted at this time. Will continue to monitor.

## 2019-10-07 NOTE — HOSPITAL COURSE
Ms. Barrios was admitted with sepsis due to right lower lobe pneumonia.  On admit, she had fever, tachycardia and lactic acidosis but no shock physiology.  Port score was 59.  She was treated with IV fluids, ceftriaxone and azithromycin as well as with prn anti-tussive medications.  She ran a high fever for awhile and this was associated with a headache that was slow to resolve until she was given a few doses of Fioricet.  On discharge she had completed a course of azithromycin and will be on Augmentin for a few more days.    In regards to her diabetes she has not been able to afford the Basaglar 30 units qhs as her new insurance does not start until next month.  Hb A1c was 14.  While here she was on Levemir 30 units at night with SSI.  Diabetic educator was consulted as patient had not given herself insulin injections without a pen before, and the pens were too expensive for her.  For the time being she has been prescribed 70/30 (N/R) insulin 15 units twice daily to last until she can resume the Basaglar.  This is a cheaper insulin that can be bought over the counter at [employer].

## 2019-10-07 NOTE — SUBJECTIVE & OBJECTIVE
Past Medical History:   Diagnosis Date    Diabetes mellitus     Hypertension        Past Surgical History:   Procedure Laterality Date    ANKLE SURGERY Bilateral      SECTION      HYSTERECTOMY         Review of patient's allergies indicates:   Allergen Reactions    Asa [aspirin]      Tongue lump up and nauseated       No current facility-administered medications on file prior to encounter.      Current Outpatient Medications on File Prior to Encounter   Medication Sig    fluticasone propionate (FLONASE) 50 mcg/actuation nasal spray 1 spray (50 mcg total) by Each Nostril route 2 (two) times daily.    insulin glargine (BASAGLAR KWIKPEN U-100 INSULIN) 100 unit/mL (3 mL) InPn pen Inject 30 Units into the skin every evening.    triamcinolone acetonide 0.025% (KENALOG) 0.025 % cream Apply topically 2 (two) times daily.     Family History     None        Tobacco Use    Smoking status: Never Smoker    Smokeless tobacco: Never Used   Substance and Sexual Activity    Alcohol use: Yes     Comment: socially    Drug use: No    Sexual activity: Yes     Partners: Male     Review of Systems   Constitutional: Positive for diaphoresis. Negative for appetite change, chills, fatigue and fever.   HENT: Negative for congestion, ear pain, postnasal drip, rhinorrhea, sinus pressure, sore throat and trouble swallowing.    Respiratory: Positive for cough. Negative for shortness of breath and wheezing.    Cardiovascular: Negative for chest pain and palpitations.   Gastrointestinal: Negative for abdominal pain, diarrhea, nausea and vomiting.   Genitourinary: Negative for dysuria, flank pain, frequency, hematuria and urgency.   Musculoskeletal: Positive for back pain. Negative for arthralgias, myalgias, neck pain and neck stiffness.   Skin: Negative for color change and pallor.   Neurological: Negative for dizziness, syncope, weakness, light-headedness, numbness and headaches.   Psychiatric/Behavioral: Negative for  confusion and decreased concentration.     Objective:     Vital Signs (Most Recent):  Temp: (!) 100.8 °F (38.2 °C) (10/07/19 0329)  Pulse: (!) 113 (10/07/19 0329)  Resp: 20 (10/07/19 0329)  BP: (!) 111/56 (10/07/19 0329)  SpO2: (!) 93 % (10/07/19 0329) Vital Signs (24h Range):  Temp:  [99.3 °F (37.4 °C)-102.7 °F (39.3 °C)] 100.8 °F (38.2 °C)  Pulse:  [] 113  Resp:  [13-34] 20  SpO2:  [93 %-97 %] 93 %  BP: (111-147)/(56-82) 111/56     Weight: 107 kg (236 lb)  Body mass index is 40.51 kg/m².    Physical Exam   Constitutional: She is oriented to person, place, and time. She appears well-developed and well-nourished. No distress.   HENT:   Head: Normocephalic and atraumatic.   Eyes: Pupils are equal, round, and reactive to light. Conjunctivae and EOM are normal. No scleral icterus.   Neck: Normal range of motion. Neck supple. No tracheal deviation present.   Cardiovascular: Normal rate, regular rhythm, normal heart sounds and intact distal pulses. Exam reveals no gallop and no friction rub.   No murmur heard.  Pulmonary/Chest: Effort normal and breath sounds normal. No stridor. No respiratory distress. She has no wheezes. She has no rales.   Abdominal: Soft. Bowel sounds are normal. She exhibits no distension. There is no tenderness. There is no guarding.   Neurological: She is alert and oriented to person, place, and time.   Skin: Skin is warm and dry. She is not diaphoretic. No erythema. No pallor.   Psychiatric: She has a normal mood and affect. Her behavior is normal. Judgment and thought content normal.   Nursing note and vitals reviewed.        CRANIAL NERVES     CN III, IV, VI   Pupils are equal, round, and reactive to light.  Extraocular motions are normal.        Significant Labs:   BMP:   Recent Labs   Lab 10/06/19  2149 10/07/19  0210   * 391*   * 132*   K 4.0 4.6    101   CO2 22* 20*   BUN 6 6   CREATININE 0.8 0.9   CALCIUM 8.5* 8.7   MG 1.6  --      CBC:   Recent Labs   Lab  10/06/19  1737 10/07/19  0210   WBC 8.41 9.38   HGB 12.9 11.4*   HCT 40.3 35.7*    229     CMP:   Recent Labs   Lab 10/06/19  1737 10/06/19  2149 10/07/19  0210   * 133* 132*   K 4.6 4.0 4.6   CL 97 101 101   CO2 22* 22* 20*   * 326* 391*   BUN 6 6 6   CREATININE 1.0 0.8 0.9   CALCIUM 9.7 8.5* 8.7   PROT 7.9  --   --    ALBUMIN 3.1*  --   --    BILITOT 0.6  --   --    ALKPHOS 109  --   --    AST 28  --   --    ALT 27  --   --    ANIONGAP 12 10 11   EGFRNONAA >60 >60 >60     Urine Culture: No results for input(s): LABURIN in the last 48 hours.  Urine Studies:   Recent Labs   Lab 10/06/19  1740   COLORU Yellow  Yellow   APPEARANCEUA Hazy*  Hazy*   PHUR 6.0  6.0   SPECGRAV <=1.005*  <=1.005*   PROTEINUA Trace*  Trace*   GLUCUA 3+*  3+*   KETONESU Negative  Negative   BILIRUBINUA Negative  Negative   OCCULTUA 1+*  1+*   NITRITE Negative  Negative   UROBILINOGEN Negative  Negative   LEUKOCYTESUR Trace*  Trace*   RBCUA 4   WBCUA 15*   BACTERIA Few*   SQUAMEPITHEL 6     All pertinent labs within the past 24 hours have been reviewed.    Significant Imaging: I have reviewed all pertinent imaging results/findings within the past 24 hours.   Imaging Results          X-Ray Chest AP Portable (Final result)  Result time 10/06/19 18:20:13    Final result by Jaciel Espitia MD (10/06/19 18:20:13)                 Impression:      Airspace opacity in the right lung base, consistent with pneumonia.      Electronically signed by: Jaciel Espitia MD  Date:    10/06/2019  Time:    18:20             Narrative:    EXAMINATION:  XR CHEST AP PORTABLE    CLINICAL HISTORY:  Sepsis;    TECHNIQUE:  Single frontal view of the chest was performed.    COMPARISON:  12/18/2018.    FINDINGS:  Monitoring EKG leads are present.  The trachea is unremarkable.  The cardiac silhouette is upper limits of normal on this AP projection.  There is elevation of both hemidiaphragms.  There are no pleural effusions.  There is no evidence  of a pneumothorax.  There is no evidence of pneumomediastinum.  There is an airspace opacity in the right lung base.  The osseous structures are unremarkable.

## 2019-10-07 NOTE — PROGRESS NOTES
Patient is alert and oriented x 4. Pt is  repositions self and ambulating independently. Pt becomes afebrile this shift and temp reported to Amarilys Murillo. Ibuprofen is ordered along with placing ice to arms and groin. Decrease of temp is noted.Pt is voidina accurately up to toilet. Drainage is clear and yellow. Pt has one episode of vomiting this shift and intermittent nausea. Zofran is given and relief is noted.  at bedtime. Pt is covered with 4 units of Aspart. Pt remains free from falls and injury this shift. All needs and concerns are met. Hourly rounding is done. Bed is locked and in lowest position, side rails up x2, call light in reach. Will continue to monitor.

## 2019-10-08 PROBLEM — R82.71 ASYMPTOMATIC BACTERIURIA: Status: ACTIVE | Noted: 2019-10-06

## 2019-10-08 LAB
ANION GAP SERPL CALC-SCNC: 7 MMOL/L (ref 8–16)
BASOPHILS # BLD AUTO: 0.03 K/UL (ref 0–0.2)
BASOPHILS NFR BLD: 0.3 % (ref 0–1.9)
BUN SERPL-MCNC: 4 MG/DL (ref 6–20)
CALCIUM SERPL-MCNC: 8.4 MG/DL (ref 8.7–10.5)
CHLORIDE SERPL-SCNC: 102 MMOL/L (ref 95–110)
CO2 SERPL-SCNC: 24 MMOL/L (ref 23–29)
CREAT SERPL-MCNC: 0.8 MG/DL (ref 0.5–1.4)
DIFFERENTIAL METHOD: ABNORMAL
EOSINOPHIL # BLD AUTO: 0.1 K/UL (ref 0–0.5)
EOSINOPHIL NFR BLD: 0.9 % (ref 0–8)
ERYTHROCYTE [DISTWIDTH] IN BLOOD BY AUTOMATED COUNT: 13.6 % (ref 11.5–14.5)
EST. GFR  (AFRICAN AMERICAN): >60 ML/MIN/1.73 M^2
EST. GFR  (NON AFRICAN AMERICAN): >60 ML/MIN/1.73 M^2
GLUCOSE SERPL-MCNC: 299 MG/DL (ref 70–110)
HCT VFR BLD AUTO: 33.5 % (ref 37–48.5)
HGB BLD-MCNC: 10.6 G/DL (ref 12–16)
IMM GRANULOCYTES # BLD AUTO: 0.05 K/UL (ref 0–0.04)
IMM GRANULOCYTES NFR BLD AUTO: 0.6 % (ref 0–0.5)
LYMPHOCYTES # BLD AUTO: 1.7 K/UL (ref 1–4.8)
LYMPHOCYTES NFR BLD: 19 % (ref 18–48)
MAGNESIUM SERPL-MCNC: 1.7 MG/DL (ref 1.6–2.6)
MCH RBC QN AUTO: 26.1 PG (ref 27–31)
MCHC RBC AUTO-ENTMCNC: 31.6 G/DL (ref 32–36)
MCV RBC AUTO: 83 FL (ref 82–98)
MONOCYTES # BLD AUTO: 0.8 K/UL (ref 0.3–1)
MONOCYTES NFR BLD: 9.1 % (ref 4–15)
NEUTROPHILS # BLD AUTO: 6.1 K/UL (ref 1.8–7.7)
NEUTROPHILS NFR BLD: 70.1 % (ref 38–73)
NRBC BLD-RTO: 0 /100 WBC
PLATELET # BLD AUTO: 251 K/UL (ref 150–350)
PMV BLD AUTO: 10.5 FL (ref 9.2–12.9)
POCT GLUCOSE: 207 MG/DL (ref 70–110)
POCT GLUCOSE: 223 MG/DL (ref 70–110)
POCT GLUCOSE: 235 MG/DL (ref 70–110)
POCT GLUCOSE: 243 MG/DL (ref 70–110)
POTASSIUM SERPL-SCNC: 3.8 MMOL/L (ref 3.5–5.1)
RBC # BLD AUTO: 4.06 M/UL (ref 4–5.4)
SODIUM SERPL-SCNC: 133 MMOL/L (ref 136–145)
WBC # BLD AUTO: 8.7 K/UL (ref 3.9–12.7)

## 2019-10-08 PROCEDURE — 11000001 HC ACUTE MED/SURG PRIVATE ROOM

## 2019-10-08 PROCEDURE — 27000646 HC AEROBIKA DEVICE

## 2019-10-08 PROCEDURE — 94664 DEMO&/EVAL PT USE INHALER: CPT

## 2019-10-08 PROCEDURE — 63600175 PHARM REV CODE 636 W HCPCS: Performed by: PHYSICIAN ASSISTANT

## 2019-10-08 PROCEDURE — 85025 COMPLETE CBC W/AUTO DIFF WBC: CPT

## 2019-10-08 PROCEDURE — 94761 N-INVAS EAR/PLS OXIMETRY MLT: CPT

## 2019-10-08 PROCEDURE — 99900035 HC TECH TIME PER 15 MIN (STAT)

## 2019-10-08 PROCEDURE — 80048 BASIC METABOLIC PNL TOTAL CA: CPT

## 2019-10-08 PROCEDURE — 83735 ASSAY OF MAGNESIUM: CPT

## 2019-10-08 PROCEDURE — 99233 PR SUBSEQUENT HOSPITAL CARE,LEVL III: ICD-10-PCS | Mod: ,,, | Performed by: HOSPITALIST

## 2019-10-08 PROCEDURE — 25000003 PHARM REV CODE 250: Performed by: PHYSICIAN ASSISTANT

## 2019-10-08 PROCEDURE — 36415 COLL VENOUS BLD VENIPUNCTURE: CPT

## 2019-10-08 PROCEDURE — 99233 SBSQ HOSP IP/OBS HIGH 50: CPT | Mod: ,,, | Performed by: HOSPITALIST

## 2019-10-08 PROCEDURE — 25000003 PHARM REV CODE 250: Performed by: HOSPITALIST

## 2019-10-08 PROCEDURE — 27000221 HC OXYGEN, UP TO 24 HOURS

## 2019-10-08 RX ORDER — ACETAMINOPHEN 325 MG/1
650 TABLET ORAL EVERY 6 HOURS PRN
Status: DISCONTINUED | OUTPATIENT
Start: 2019-10-08 | End: 2019-10-10 | Stop reason: HOSPADM

## 2019-10-08 RX ORDER — OXYCODONE AND ACETAMINOPHEN 5; 325 MG/1; MG/1
1 TABLET ORAL EVERY 6 HOURS PRN
Status: DISCONTINUED | OUTPATIENT
Start: 2019-10-08 | End: 2019-10-10 | Stop reason: HOSPADM

## 2019-10-08 RX ORDER — AZITHROMYCIN 250 MG/1
250 TABLET, FILM COATED ORAL DAILY
Status: DISCONTINUED | OUTPATIENT
Start: 2019-10-08 | End: 2019-10-08

## 2019-10-08 RX ORDER — AZITHROMYCIN 250 MG/1
250 TABLET, FILM COATED ORAL DAILY
Status: COMPLETED | OUTPATIENT
Start: 2019-10-08 | End: 2019-10-10

## 2019-10-08 RX ADMIN — OXYCODONE HYDROCHLORIDE AND ACETAMINOPHEN 1 TABLET: 5; 325 TABLET ORAL at 11:10

## 2019-10-08 RX ADMIN — INSULIN ASPART 4 UNITS: 100 INJECTION, SOLUTION INTRAVENOUS; SUBCUTANEOUS at 08:10

## 2019-10-08 RX ADMIN — ENOXAPARIN SODIUM 40 MG: 100 INJECTION SUBCUTANEOUS at 06:10

## 2019-10-08 RX ADMIN — INSULIN ASPART 2 UNITS: 100 INJECTION, SOLUTION INTRAVENOUS; SUBCUTANEOUS at 09:10

## 2019-10-08 RX ADMIN — ACETAMINOPHEN 650 MG: 325 TABLET, FILM COATED ORAL at 06:10

## 2019-10-08 RX ADMIN — ACETAMINOPHEN 650 MG: 325 TABLET, FILM COATED ORAL at 09:10

## 2019-10-08 RX ADMIN — GUAIFENESIN 200 MG: 100 SOLUTION ORAL at 06:10

## 2019-10-08 RX ADMIN — OXYCODONE HYDROCHLORIDE AND ACETAMINOPHEN 1 TABLET: 5; 325 TABLET ORAL at 06:10

## 2019-10-08 RX ADMIN — CEFTRIAXONE 1 G: 1 INJECTION, SOLUTION INTRAVENOUS at 06:10

## 2019-10-08 RX ADMIN — INSULIN DETEMIR 30 UNITS: 100 INJECTION, SOLUTION SUBCUTANEOUS at 09:10

## 2019-10-08 RX ADMIN — AZITHROMYCIN MONOHYDRATE 250 MG: 250 TABLET ORAL at 11:10

## 2019-10-08 RX ADMIN — INSULIN ASPART 4 UNITS: 100 INJECTION, SOLUTION INTRAVENOUS; SUBCUTANEOUS at 01:10

## 2019-10-08 RX ADMIN — SODIUM CHLORIDE: 0.9 INJECTION, SOLUTION INTRAVENOUS at 06:10

## 2019-10-08 NOTE — ASSESSMENT & PLAN NOTE
- Patient with RLL PNA   - PNA studies ordered   - PORT score: was 59 at worst,  39 at time of admission  - procalcitonin elevated at 0.46.  - continue CAP treatment w/Rocephin & azithromycin.  Change azithromycin to oral (given in 5% dextrose 250 mL)  - Add a capella CPT with [employer]

## 2019-10-08 NOTE — SUBJECTIVE & OBJECTIVE
Interval History:  Patient complains of headache not relieved by tramadol or Tylenol.  Has been febrile to 102.6 last night, current temp 99.  She feels pretty bad.  Dry cough, trying to cough up mucus.  BG elevated.    Review of Systems   Constitutional: Positive for chills and fever.   Respiratory: Positive for cough and shortness of breath.    Cardiovascular: Negative for chest pain and palpitations.   Neurological: Positive for headaches.     Objective:     Vital Signs (Most Recent):  Temp: 99 °F (37.2 °C) (10/08/19 0736)  Pulse: 97 (10/08/19 0736)  Resp: 16 (10/08/19 0736)  BP: (!) 106/57 (10/08/19 0736)  SpO2: 95 % (10/08/19 0736) Vital Signs (24h Range):  Temp:  [98 °F (36.7 °C)-102.6 °F (39.2 °C)] 99 °F (37.2 °C)  Pulse:  [] 97  Resp:  [16-20] 16  SpO2:  [93 %-98 %] 95 %  BP: (106-162)/(57-79) 106/57     Weight: 107 kg (236 lb)  Body mass index is 40.51 kg/m².    Intake/Output Summary (Last 24 hours) at 10/8/2019 1048  Last data filed at 10/8/2019 0640  Gross per 24 hour   Intake 1400 ml   Output 2250 ml   Net -850 ml      Physical Exam   Constitutional: She is oriented to person, place, and time. She appears well-developed and well-nourished.   Ill-appearing.   HENT:   Head: Normocephalic.   Eyes: Pupils are equal, round, and reactive to light. Conjunctivae are normal.   Neck: Neck supple. No thyromegaly present.   Cardiovascular: Normal rate, regular rhythm and intact distal pulses. Exam reveals no gallop and no friction rub.   Murmur heard.  Pulmonary/Chest: Effort normal. She has rales.   Breath sounds decreased both bases.  Rales right base.   Abdominal: Soft. Bowel sounds are normal. She exhibits no distension. There is no tenderness.   Musculoskeletal: Normal range of motion. She exhibits no edema.   Lymphadenopathy:     She has no cervical adenopathy.   Neurological: She is alert and oriented to person, place, and time.   Strength equal and symmetric   Skin: Skin is warm and dry. No rash noted.    Psychiatric: She has a normal mood and affect. Her behavior is normal. Thought content normal.       Significant Labs: All pertinent labs within the past 24 hours have been reviewed.    Significant Imaging: I have reviewed all pertinent imaging results/findings within the past 24 hours.

## 2019-10-08 NOTE — ASSESSMENT & PLAN NOTE
- hyperglycemic w/ glycosuria at time of admission   - no DKA   - states she is non-compliant with meds due to lack of insurance.  Needs  consult.  - A1C 14  - Diabetic diet   - Might have to recommend OTC Relion brand from [employer].  - As above changed azithromycin to oral.  Monitor closely on 30 units Levemir nightly with SSI to determine outpatient regimen.

## 2019-10-08 NOTE — PLAN OF CARE
Pt remains free from falls. Vitals were stable throughout the night on room air. Positions self independently. Pain managed with PO medications, no complaints of nausea. Bed in low position and call light within reach. Will continue to monitor.

## 2019-10-08 NOTE — PROGRESS NOTES
[facility] Hospital Medicine  Progress Note    Patient Name: Car Barrios  MRN: [id number]  Patient Class: IP- Inpatient   Admission Date: 10/6/2019  Length of Stay: 2 days  Attending Physician: Mai Morales MD  Primary Care Provider: Adriel Mcbride Jr, MD        Subjective:     Principal Problem:Pneumonia of right lower lobe due to infectious organism        HPI:  Ms. Car Barrios is a 38 y.o. female, with PMH of IDDM-2, HTN, who presented to [facility] ED on 10/6/19 2/2 chills and body aches x 2 days. She notes associated non-productive cough, myalgias, nausea, low back pain. She denies chest pain, SOB, V/D, dysuria. She endorses medication non-compliance 2/2 insurance issues. Upon presentation she was found to be febrile up to 102.7.F PO. She was evaluated in the ED, and noted to have elevated lactic acid, hyperglycemia without open anion gap, and normal beta-hydroxybutyrate, and UTI. She was treated with rocephin/azithromycin and admitted to inpatient status.     Overview/Hospital Course:  Mrs. Barrios is a 38 year old woman with history of diabetes mellitus and hypertension who was admitted with sepsis due to right lower lobe pneumonia and urinary tract infection.  On admit, she had fever, tachycardia and lactic acidosis but no shock physiology.  Port score was 59.  She is being treated with IV fluids, rocephin and azithromycin as well as with prn anti-tussive medications.  In regards to her diabetes and hypertension she has been non-compliant due to lack of funding for her medications.  Await A1c and resume home detemir insulin with supplemental sliding scale insulin.  Blood pressure medication was held given relatively low BP in the face of sepsis.      Interval History:  Patient complains of headache not relieved by tramadol or Tylenol.  Has been febrile to 102.6 last night, current temp 99.  She feels pretty bad.  Dry cough, trying to cough up mucus.  BG elevated.    Review of  Systems   Constitutional: Positive for chills and fever.   Respiratory: Positive for cough and shortness of breath.    Cardiovascular: Negative for chest pain and palpitations.   Neurological: Positive for headaches.     Objective:     Vital Signs (Most Recent):  Temp: 99 °F (37.2 °C) (10/08/19 0736)  Pulse: 97 (10/08/19 0736)  Resp: 16 (10/08/19 0736)  BP: (!) 106/57 (10/08/19 0736)  SpO2: 95 % (10/08/19 0736) Vital Signs (24h Range):  Temp:  [98 °F (36.7 °C)-102.6 °F (39.2 °C)] 99 °F (37.2 °C)  Pulse:  [] 97  Resp:  [16-20] 16  SpO2:  [93 %-98 %] 95 %  BP: (106-162)/(57-79) 106/57     Weight: 107 kg (236 lb)  Body mass index is 40.51 kg/m².    Intake/Output Summary (Last 24 hours) at 10/8/2019 1048  Last data filed at 10/8/2019 0640  Gross per 24 hour   Intake 1400 ml   Output 2250 ml   Net -850 ml      Physical Exam   Constitutional: She is oriented to person, place, and time. She appears well-developed and well-nourished.   Ill-appearing.   HENT:   Head: Normocephalic.   Eyes: Pupils are equal, round, and reactive to light. Conjunctivae are normal.   Neck: Neck supple. No thyromegaly present.   Cardiovascular: Normal rate, regular rhythm and intact distal pulses. Exam reveals no gallop and no friction rub.   Murmur heard.  Pulmonary/Chest: Effort normal. She has rales.   Breath sounds decreased both bases.  Rales right base.   Abdominal: Soft. Bowel sounds are normal. She exhibits no distension. There is no tenderness.   Musculoskeletal: Normal range of motion. She exhibits no edema.   Lymphadenopathy:     She has no cervical adenopathy.   Neurological: She is alert and oriented to person, place, and time.   Strength equal and symmetric   Skin: Skin is warm and dry. No rash noted.   Psychiatric: She has a normal mood and affect. Her behavior is normal. Thought content normal.       Significant Labs: All pertinent labs within the past 24 hours have been reviewed.    Significant Imaging: I have reviewed all  pertinent imaging results/findings within the past 24 hours.      Assessment/Plan:      * Pneumonia of right lower lobe due to infectious organism  - Patient with RLL PNA   - PNA studies ordered   - PORT score: was 59 at worst,  39 at time of admission  - procalcitonin elevated at 0.46.  - continue CAP treatment w/Rocephin & azithromycin.  Change azithromycin to oral (given in 5% dextrose 250 mL)  - Add a [facility] CPT with [employer]    Essential hypertension  - states she is non-compliant with meds   - normo tensive to low throughout stay thus far  - monitor     Type 2 diabetes mellitus with hyperglycemia, with long-term current use of insulin  - hyperglycemic w/ glycosuria at time of admission   - no DKA   - states she is non-compliant with meds due to lack of insurance.  Needs  consult.  - A1C 14  - Diabetic diet   - Might have to recommend OTC Relion brand from [employer].  - As above changed azithromycin to oral.  Monitor closely on 30 units Levemir nightly with SSI to determine outpatient regimen.      Asymptomatic bacteriuria  - Culture with >100K E coli.  Patient does not have dysuria or frequency, therefore this is asymptomatic bacteriuria and does not need to be treated.      VTE Risk Mitigation (From admission, onward)         Ordered     enoxaparin injection 40 mg  Daily      10/07/19 0658     IP VTE HIGH RISK PATIENT  Once      10/07/19 0658                      Mai Mann MD  Department of Hospital Medicine   [facility]

## 2019-10-08 NOTE — ASSESSMENT & PLAN NOTE
- Culture with >100K E coli.  Patient does not have dysuria or frequency, therefore this is asymptomatic bacteriuria and does not need to be treated.

## 2019-10-08 NOTE — ASSESSMENT & PLAN NOTE
- states she is non-compliant with meds   - normo tensive to low throughout stay thus far  - monitor

## 2019-10-09 LAB
ANION GAP SERPL CALC-SCNC: 6 MMOL/L (ref 8–16)
BACTERIA UR CULT: ABNORMAL
BASOPHILS # BLD AUTO: 0.05 K/UL (ref 0–0.2)
BASOPHILS NFR BLD: 0.7 % (ref 0–1.9)
BUN SERPL-MCNC: 3 MG/DL (ref 6–20)
CALCIUM SERPL-MCNC: 8.8 MG/DL (ref 8.7–10.5)
CHLORIDE SERPL-SCNC: 106 MMOL/L (ref 95–110)
CO2 SERPL-SCNC: 26 MMOL/L (ref 23–29)
CREAT SERPL-MCNC: 0.7 MG/DL (ref 0.5–1.4)
DIFFERENTIAL METHOD: ABNORMAL
EOSINOPHIL # BLD AUTO: 0.2 K/UL (ref 0–0.5)
EOSINOPHIL NFR BLD: 2.7 % (ref 0–8)
ERYTHROCYTE [DISTWIDTH] IN BLOOD BY AUTOMATED COUNT: 13.7 % (ref 11.5–14.5)
EST. GFR  (AFRICAN AMERICAN): >60 ML/MIN/1.73 M^2
EST. GFR  (NON AFRICAN AMERICAN): >60 ML/MIN/1.73 M^2
GLUCOSE SERPL-MCNC: 186 MG/DL (ref 70–110)
HCT VFR BLD AUTO: 33 % (ref 37–48.5)
HGB BLD-MCNC: 10.4 G/DL (ref 12–16)
IMM GRANULOCYTES # BLD AUTO: 0.04 K/UL (ref 0–0.04)
IMM GRANULOCYTES NFR BLD AUTO: 0.5 % (ref 0–0.5)
LYMPHOCYTES # BLD AUTO: 2.4 K/UL (ref 1–4.8)
LYMPHOCYTES NFR BLD: 31 % (ref 18–48)
MAGNESIUM SERPL-MCNC: 1.8 MG/DL (ref 1.6–2.6)
MCH RBC QN AUTO: 26.1 PG (ref 27–31)
MCHC RBC AUTO-ENTMCNC: 31.5 G/DL (ref 32–36)
MCV RBC AUTO: 83 FL (ref 82–98)
MONOCYTES # BLD AUTO: 0.7 K/UL (ref 0.3–1)
MONOCYTES NFR BLD: 9.1 % (ref 4–15)
NEUTROPHILS # BLD AUTO: 4.3 K/UL (ref 1.8–7.7)
NEUTROPHILS NFR BLD: 56 % (ref 38–73)
NRBC BLD-RTO: 0 /100 WBC
PLATELET # BLD AUTO: 274 K/UL (ref 150–350)
PMV BLD AUTO: 10.1 FL (ref 9.2–12.9)
POCT GLUCOSE: 176 MG/DL (ref 70–110)
POCT GLUCOSE: 179 MG/DL (ref 70–110)
POCT GLUCOSE: 183 MG/DL (ref 70–110)
POTASSIUM SERPL-SCNC: 3.6 MMOL/L (ref 3.5–5.1)
RBC # BLD AUTO: 3.99 M/UL (ref 4–5.4)
SODIUM SERPL-SCNC: 138 MMOL/L (ref 136–145)
WBC # BLD AUTO: 7.67 K/UL (ref 3.9–12.7)

## 2019-10-09 PROCEDURE — 99233 SBSQ HOSP IP/OBS HIGH 50: CPT | Mod: ,,, | Performed by: HOSPITALIST

## 2019-10-09 PROCEDURE — 36415 COLL VENOUS BLD VENIPUNCTURE: CPT

## 2019-10-09 PROCEDURE — 83735 ASSAY OF MAGNESIUM: CPT

## 2019-10-09 PROCEDURE — 80048 BASIC METABOLIC PNL TOTAL CA: CPT

## 2019-10-09 PROCEDURE — 25000003 PHARM REV CODE 250: Performed by: HOSPITALIST

## 2019-10-09 PROCEDURE — 94664 DEMO&/EVAL PT USE INHALER: CPT

## 2019-10-09 PROCEDURE — 94761 N-INVAS EAR/PLS OXIMETRY MLT: CPT

## 2019-10-09 PROCEDURE — 99900035 HC TECH TIME PER 15 MIN (STAT)

## 2019-10-09 PROCEDURE — 63600175 PHARM REV CODE 636 W HCPCS: Performed by: PHYSICIAN ASSISTANT

## 2019-10-09 PROCEDURE — 27000221 HC OXYGEN, UP TO 24 HOURS

## 2019-10-09 PROCEDURE — 85025 COMPLETE CBC W/AUTO DIFF WBC: CPT

## 2019-10-09 PROCEDURE — 11000001 HC ACUTE MED/SURG PRIVATE ROOM

## 2019-10-09 PROCEDURE — 99233 PR SUBSEQUENT HOSPITAL CARE,LEVL III: ICD-10-PCS | Mod: ,,, | Performed by: HOSPITALIST

## 2019-10-09 RX ORDER — BUTALBITAL, ACETAMINOPHEN AND CAFFEINE 50; 325; 40 MG/1; MG/1; MG/1
1 TABLET ORAL EVERY 4 HOURS PRN
Status: DISCONTINUED | OUTPATIENT
Start: 2019-10-09 | End: 2019-10-10 | Stop reason: HOSPADM

## 2019-10-09 RX ADMIN — BUTALBITAL, ACETAMINOPHEN AND CAFFEINE 1 TABLET: 50; 325; 40 TABLET ORAL at 01:10

## 2019-10-09 RX ADMIN — OXYCODONE HYDROCHLORIDE AND ACETAMINOPHEN 1 TABLET: 5; 325 TABLET ORAL at 09:10

## 2019-10-09 RX ADMIN — CEFTRIAXONE 1 G: 1 INJECTION, SOLUTION INTRAVENOUS at 06:10

## 2019-10-09 RX ADMIN — INSULIN ASPART 2 UNITS: 100 INJECTION, SOLUTION INTRAVENOUS; SUBCUTANEOUS at 09:10

## 2019-10-09 RX ADMIN — INSULIN DETEMIR 30 UNITS: 100 INJECTION, SOLUTION SUBCUTANEOUS at 10:10

## 2019-10-09 RX ADMIN — INSULIN ASPART 1 UNITS: 100 INJECTION, SOLUTION INTRAVENOUS; SUBCUTANEOUS at 10:10

## 2019-10-09 RX ADMIN — SODIUM CHLORIDE: 0.9 INJECTION, SOLUTION INTRAVENOUS at 11:10

## 2019-10-09 RX ADMIN — ACETAMINOPHEN 650 MG: 325 TABLET, FILM COATED ORAL at 04:10

## 2019-10-09 RX ADMIN — SODIUM CHLORIDE: 0.9 INJECTION, SOLUTION INTRAVENOUS at 01:10

## 2019-10-09 RX ADMIN — SODIUM CHLORIDE: 0.9 INJECTION, SOLUTION INTRAVENOUS at 04:10

## 2019-10-09 RX ADMIN — ENOXAPARIN SODIUM 40 MG: 100 INJECTION SUBCUTANEOUS at 04:10

## 2019-10-09 RX ADMIN — AZITHROMYCIN MONOHYDRATE 250 MG: 250 TABLET ORAL at 09:10

## 2019-10-09 RX ADMIN — INSULIN ASPART 2 UNITS: 100 INJECTION, SOLUTION INTRAVENOUS; SUBCUTANEOUS at 03:10

## 2019-10-09 NOTE — SUBJECTIVE & OBJECTIVE
Interval History:  Fever improving but she still has headache.  Percocet improves it only because it makes her fall asleep.  Headache is worse right behind her right eye.    Review of Systems   Constitutional: Negative for chills and fever.   Respiratory: Positive for cough. Negative for shortness of breath.    Cardiovascular: Negative for chest pain and palpitations.   Neurological: Positive for headaches.     Objective:     Vital Signs (Most Recent):  Temp: 98.8 °F (37.1 °C) (10/09/19 1239)  Pulse: 86 (10/09/19 1239)  Resp: 18 (10/09/19 1239)  BP: 136/88 (10/09/19 1239)  SpO2: 96 % (10/09/19 1239) Vital Signs (24h Range):  Temp:  [98.8 °F (37.1 °C)-100.2 °F (37.9 °C)] 98.8 °F (37.1 °C)  Pulse:  [] 86  Resp:  [16-22] 18  SpO2:  [88 %-98 %] 96 %  BP: (109-136)/(68-88) 136/88     Weight: 107 kg (236 lb)  Body mass index is 40.51 kg/m².    Intake/Output Summary (Last 24 hours) at 10/9/2019 1312  Last data filed at 10/9/2019 0446  Gross per 24 hour   Intake 1218.75 ml   Output 900 ml   Net 318.75 ml      Physical Exam   Constitutional: She is oriented to person, place, and time. She appears well-developed and well-nourished.   HENT:   Head: Normocephalic.   Eyes: Pupils are equal, round, and reactive to light. Conjunctivae are normal.   Neck: Neck supple. No thyromegaly present.   Cardiovascular: Normal rate, regular rhythm and intact distal pulses. Exam reveals no gallop and no friction rub.   Murmur heard.  Pulmonary/Chest: Effort normal and breath sounds normal. She has no rales.   Breath sounds decreased both bases.     Abdominal: Soft. Bowel sounds are normal. She exhibits no distension. There is no tenderness.   Musculoskeletal: Normal range of motion. She exhibits no edema.   Lymphadenopathy:     She has no cervical adenopathy.   Neurological: She is alert and oriented to person, place, and time.   Strength equal and symmetric   Skin: Skin is warm and dry. No rash noted.   Psychiatric: She has a normal  mood and affect. Her behavior is normal. Thought content normal.       Significant Labs: All pertinent labs within the past 24 hours have been reviewed.    Significant Imaging: I have reviewed all pertinent imaging results/findings within the past 24 hours.

## 2019-10-09 NOTE — ASSESSMENT & PLAN NOTE
- states she is non-compliant with meds   - normo tensive to low throughout stay thus far.  Might be able to avoid prescribing BP medication.  - monitor

## 2019-10-09 NOTE — ASSESSMENT & PLAN NOTE
- Patient with RLL PNA   - PNA studies ordered   - PORT score: was 59 at worst,  39 at time of admission  - procalcitonin elevated at 0.46.  - continue CAP treatment w/Rocephin & azithromycin.    - Continue a [facility] CPT with [employer]

## 2019-10-09 NOTE — PROGRESS NOTES
[facility] Hospital Medicine  Progress Note    Patient Name: Car Barrios  MRN: [id number]  Patient Class: IP- Inpatient   Admission Date: 10/6/2019  Length of Stay: 3 days  Attending Physician: Mai Morales MD  Primary Care Provider: Adriel Mcbride Jr, MD        Subjective:     Principal Problem:Pneumonia of right lower lobe due to infectious organism        HPI:  Ms. Car Barrios is a 38 y.o. female, with PMH of IDDM-2, HTN, who [place] [facility] [place]19 2/2 chills and body aches x 2 days. She notes associated non-productive cough, myalgias, nausea, low back pain. She denies chest pain, SOB, V/D, dysuria. She endorses medication non-compliance 2/2 insurance issues. Upon presentation she was found to be febrile up to 102.7.F PO. She was evaluated in the ED, and noted to have elevated lactic acid, hyperglycemia without open anion gap, and normal beta-hydroxybutyrate, and UTI. She was treated with rocephin/azithromycin and admitted to inpatient status.     Overview/Hospital Course:  Mrs. Barrios is a 38 year old woman with history of diabetes mellitus and hypertension who was admitted with sepsis due to right lower lobe pneumonia and urinary tract infection.  On admit, she had fever, tachycardia and lactic acidosis but no shock physiology.  Port score was 59.  She is being treated with IV fluids, rocephin and azithromycin as well as with prn anti-tussive medications.  She ran a high fever for awhile and this was associated with a headache that was slow to resolve.    In regards to her diabetes she has been non-compliant due to lack of funding for her medications.  Hb A1c was 14.  Reportedly she was on Basaglar 30 units at night, and when this was started along with SSI her BG decreased to around 200.  Diabetic educator was consulted as patient had not given herself insulin injections without a pen before, and the pens were too expensive for her.      Blood pressure medication  was held given relatively low BP in the face of sepsis.      Interval History:  Fever improving but she still has headache.  Percocet improves it only because it makes her fall asleep.  Headache is worse right behind her right eye.    Review of Systems   Constitutional: Negative for chills and fever.   Respiratory: Positive for cough. Negative for shortness of breath.    Cardiovascular: Negative for chest pain and palpitations.   Neurological: Positive for headaches.     Objective:     Vital Signs (Most Recent):  Temp: 98.8 °F (37.1 °C) (10/09/19 1239)  Pulse: 86 (10/09/19 1239)  Resp: 18 (10/09/19 1239)  BP: 136/88 (10/09/19 1239)  SpO2: 96 % (10/09/19 1239) Vital Signs (24h Range):  Temp:  [98.8 °F (37.1 °C)-100.2 °F (37.9 °C)] 98.8 °F (37.1 °C)  Pulse:  [] 86  Resp:  [16-22] 18  SpO2:  [88 %-98 %] 96 %  BP: (109-136)/(68-88) 136/88     Weight: 107 kg (236 lb)  Body mass index is 40.51 kg/m².    Intake/Output Summary (Last 24 hours) at 10/9/2019 1312  Last data filed at 10/9/2019 0446  Gross per 24 hour   Intake 1218.75 ml   Output 900 ml   Net 318.75 ml      Physical Exam   Constitutional: She is oriented to person, place, and time. She appears well-developed and well-nourished.   HENT:   Head: Normocephalic.   Eyes: Pupils are equal, round, and reactive to light. Conjunctivae are normal.   Neck: Neck supple. No thyromegaly present.   Cardiovascular: Normal rate, regular rhythm and intact distal pulses. Exam reveals no gallop and no friction rub.   Murmur heard.  Pulmonary/Chest: Effort normal and breath sounds normal. She has no rales.   Breath sounds decreased both bases.     Abdominal: Soft. Bowel sounds are normal. She exhibits no distension. There is no tenderness.   Musculoskeletal: Normal range of motion. She exhibits no edema.   Lymphadenopathy:     She has no cervical adenopathy.   Neurological: She is alert and oriented to person, place, and time.   Strength equal and symmetric   Skin: Skin is  warm and dry. No rash noted.   Psychiatric: She has a normal mood and affect. Her behavior is normal. Thought content normal.       Significant Labs: All pertinent labs within the past 24 hours have been reviewed.    Significant Imaging: I have reviewed all pertinent imaging results/findings within the past 24 hours.      Assessment/Plan:      * Pneumonia of right lower lobe due to infectious organism  - Patient with RLL PNA   - PNA studies ordered   - PORT score: was 59 at worst,  39 at time of admission  - procalcitonin elevated at 0.46.  - continue CAP treatment w/Rocephin & azithromycin.    - Continue a [facility] CPT with Aerobika    Essential hypertension  - states she is non-compliant with meds   - normo tensive to low throughout stay thus far.  Might be able to avoid prescribing BP medication.  - monitor     Type 2 diabetes mellitus with hyperglycemia, with long-term current use of insulin  - hyperglycemic w/ glycosuria at time of admission   - no DKA   - states she is non-compliant with meds due to lack of insurance.  Seen by diabetic educator.  Recommend OTC Relion insulin from [facility] until her insurance starts in November.  - A1C 14  - Diabetic diet   - As above changed azithromycin to oral.  Monitor closely on 30 units Levemir nightly with SSI to determine outpatient regimen.      Asymptomatic bacteriuria  - Culture with >100K E coli.  Patient does not have dysuria or frequency, therefore this is asymptomatic bacteriuria and does not need to be treated.      VTE Risk Mitigation (From admission, onward)         Ordered     enoxaparin injection 40 mg  Daily      10/07/19 0658     IP VTE HIGH RISK PATIENT  Once      10/07/19 0658                      Mai Mann MD  Department of Hospital Medicine   [facility]

## 2019-10-09 NOTE — PLAN OF CARE
Patient on 2 LPM NC sat's 91% with no distress noted Aerobika done with good effort ongoing monitoring in progress.

## 2019-10-09 NOTE — PLAN OF CARE
Met with pt at bedside.  Pt understands importance to followup with PCP after insurance begins next month.    Pt seen by diabetic educator who provided information on insulin and importance of followup.    Pt verbalizes no anticipated DC needs.    CM to continue to follow.       10/09/19 1500   Discharge Reassessment   Assessment Type Discharge Planning Reassessment   Provided patient/caregiver education on the expected discharge date and the discharge plan Yes   Do you have any problems affording any of your prescribed medications? No   Discharge Plan A Home   DME Needed Upon Discharge  none

## 2019-10-09 NOTE — ASSESSMENT & PLAN NOTE
- hyperglycemic w/ glycosuria at time of admission   - no DKA   - states she is non-compliant with meds due to lack of insurance.  Seen by diabetic educator.  Recommend OTC Relion insulin from [facility] until her insurance starts in November.  - A1C 14  - Diabetic diet   - As above changed azithromycin to oral.  Monitor closely on 30 units Levemir nightly with SSI to determine outpatient regimen.

## 2019-10-09 NOTE — CONSULTS
Patient seen by DM ed. Has been unable to take insulin d/t insurance/cost issues. Employer health plan beginning in November. Sample meter brought for patient, discussed purchasing OTC supplies in [employer] Relion brand. Log sheets provided, reviewed goal BG and importance of keeping BG well controlled.     Discussed OTC insulin options while waiting for insurance to begin. Insulin tip sheet reviewed with patient. Has done vial/syringe injections for sister in the past.     Stressed importance of frequent f/u w/ PCP once insurance starts to get A1C better controlled and prevent complications.     All questions answered at this time.

## 2019-10-09 NOTE — PLAN OF CARE
[facility], pt is over income for medicaid.  Pt does not have health insurance.      Pt states her insurance with her employer will begin in November.

## 2019-10-10 VITALS
HEIGHT: 64 IN | TEMPERATURE: 99 F | WEIGHT: 236 LBS | SYSTOLIC BLOOD PRESSURE: 109 MMHG | DIASTOLIC BLOOD PRESSURE: 65 MMHG | OXYGEN SATURATION: 97 % | BODY MASS INDEX: 40.29 KG/M2 | RESPIRATION RATE: 16 BRPM | HEART RATE: 80 BPM

## 2019-10-10 LAB
ANION GAP SERPL CALC-SCNC: 9 MMOL/L (ref 8–16)
BASOPHILS # BLD AUTO: ABNORMAL K/UL (ref 0–0.2)
BASOPHILS NFR BLD: 0 % (ref 0–1.9)
BUN SERPL-MCNC: 3 MG/DL (ref 6–20)
CALCIUM SERPL-MCNC: 8.8 MG/DL (ref 8.7–10.5)
CHLORIDE SERPL-SCNC: 107 MMOL/L (ref 95–110)
CO2 SERPL-SCNC: 24 MMOL/L (ref 23–29)
CREAT SERPL-MCNC: 0.7 MG/DL (ref 0.5–1.4)
DIFFERENTIAL METHOD: ABNORMAL
ENTEROVIRUS: NOT DETECTED
EOSINOPHIL # BLD AUTO: ABNORMAL K/UL (ref 0–0.5)
EOSINOPHIL NFR BLD: 5 % (ref 0–8)
ERYTHROCYTE [DISTWIDTH] IN BLOOD BY AUTOMATED COUNT: 13.8 % (ref 11.5–14.5)
EST. GFR  (AFRICAN AMERICAN): >60 ML/MIN/1.73 M^2
EST. GFR  (NON AFRICAN AMERICAN): >60 ML/MIN/1.73 M^2
GLUCOSE SERPL-MCNC: 159 MG/DL (ref 70–110)
HCT VFR BLD AUTO: 34.3 % (ref 37–48.5)
HGB BLD-MCNC: 10.8 G/DL (ref 12–16)
HUMAN BOCAVIRUS: NOT DETECTED
HUMAN CORONAVIRUS, COMMON COLD VIRUS: NOT DETECTED
IMM GRANULOCYTES # BLD AUTO: ABNORMAL K/UL (ref 0–0.04)
IMM GRANULOCYTES NFR BLD AUTO: ABNORMAL % (ref 0–0.5)
INFLUENZA A - H1N1-09: NOT DETECTED
LYMPHOCYTES # BLD AUTO: ABNORMAL K/UL (ref 1–4.8)
LYMPHOCYTES NFR BLD: 57 % (ref 18–48)
MAGNESIUM SERPL-MCNC: 1.9 MG/DL (ref 1.6–2.6)
MCH RBC QN AUTO: 26 PG (ref 27–31)
MCHC RBC AUTO-ENTMCNC: 31.5 G/DL (ref 32–36)
MCV RBC AUTO: 83 FL (ref 82–98)
MONOCYTES # BLD AUTO: ABNORMAL K/UL (ref 0.3–1)
MONOCYTES NFR BLD: 4 % (ref 4–15)
NEUTROPHILS NFR BLD: 34 % (ref 38–73)
NRBC BLD-RTO: 0 /100 WBC
PARAINFLUENZA: NOT DETECTED
PLATELET # BLD AUTO: 324 K/UL (ref 150–350)
PLATELET BLD QL SMEAR: ABNORMAL
PMV BLD AUTO: 10 FL (ref 9.2–12.9)
POLYCHROMASIA BLD QL SMEAR: ABNORMAL
POTASSIUM SERPL-SCNC: 3.7 MMOL/L (ref 3.5–5.1)
RBC # BLD AUTO: 4.16 M/UL (ref 4–5.4)
RVP - ADENOVIRUS: NOT DETECTED
RVP - HUMAN METAPNEUMOVIRUS (HMPV): NOT DETECTED
RVP - INFLUENZA A: NOT DETECTED
RVP - INFLUENZA B: NOT DETECTED
RVP - RESPIRATORY SYNCTIAL VIRUS (RSV) A: NOT DETECTED
RVP - RESPIRATORY VIRAL PANEL, SOURCE: NORMAL
RVP - RHINOVIRUS: NOT DETECTED
SODIUM SERPL-SCNC: 140 MMOL/L (ref 136–145)
WBC # BLD AUTO: 7.4 K/UL (ref 3.9–12.7)

## 2019-10-10 PROCEDURE — 25000003 PHARM REV CODE 250: Performed by: HOSPITALIST

## 2019-10-10 PROCEDURE — 99238 HOSP IP/OBS DSCHRG MGMT 30/<: CPT | Mod: ,,, | Performed by: HOSPITALIST

## 2019-10-10 PROCEDURE — 99238 PR HOSPITAL DISCHARGE DAY,<30 MIN: ICD-10-PCS | Mod: ,,, | Performed by: HOSPITALIST

## 2019-10-10 PROCEDURE — 80048 BASIC METABOLIC PNL TOTAL CA: CPT

## 2019-10-10 PROCEDURE — 99900035 HC TECH TIME PER 15 MIN (STAT)

## 2019-10-10 PROCEDURE — 85027 COMPLETE CBC AUTOMATED: CPT

## 2019-10-10 PROCEDURE — 94761 N-INVAS EAR/PLS OXIMETRY MLT: CPT

## 2019-10-10 PROCEDURE — 36415 COLL VENOUS BLD VENIPUNCTURE: CPT

## 2019-10-10 PROCEDURE — 63600175 PHARM REV CODE 636 W HCPCS: Performed by: PHYSICIAN ASSISTANT

## 2019-10-10 PROCEDURE — 83735 ASSAY OF MAGNESIUM: CPT

## 2019-10-10 PROCEDURE — 94664 DEMO&/EVAL PT USE INHALER: CPT

## 2019-10-10 PROCEDURE — 85007 BL SMEAR W/DIFF WBC COUNT: CPT

## 2019-10-10 RX ORDER — PEN NEEDLE, DIABETIC 29 G X1/2"
1 NEEDLE, DISPOSABLE MISCELLANEOUS 2 TIMES DAILY
Qty: 60 EACH | Refills: 0 | Status: SHIPPED | OUTPATIENT
Start: 2019-10-10 | End: 2020-03-04 | Stop reason: CLARIF

## 2019-10-10 RX ORDER — AMOXICILLIN AND CLAVULANATE POTASSIUM 875; 125 MG/1; MG/1
1 TABLET, FILM COATED ORAL EVERY 12 HOURS
Qty: 6 TABLET | Refills: 0 | Status: SHIPPED | OUTPATIENT
Start: 2019-10-10 | End: 2019-10-13

## 2019-10-10 RX ORDER — LANCETS
1 EACH MISCELLANEOUS
Qty: 100 EACH | Refills: 0 | Status: SHIPPED | OUTPATIENT
Start: 2019-10-10 | End: 2020-03-04 | Stop reason: CLARIF

## 2019-10-10 RX ORDER — BUTALBITAL, ACETAMINOPHEN AND CAFFEINE 50; 325; 40 MG/1; MG/1; MG/1
1 TABLET ORAL EVERY 6 HOURS PRN
Qty: 7 TABLET | Refills: 0 | Status: SHIPPED | OUTPATIENT
Start: 2019-10-10 | End: 2019-11-09

## 2019-10-10 RX ADMIN — AZITHROMYCIN MONOHYDRATE 250 MG: 250 TABLET ORAL at 08:10

## 2019-10-10 RX ADMIN — SODIUM CHLORIDE: 0.9 INJECTION, SOLUTION INTRAVENOUS at 07:10

## 2019-10-10 RX ADMIN — BUTALBITAL, ACETAMINOPHEN AND CAFFEINE 1 TABLET: 50; 325; 40 TABLET ORAL at 05:10

## 2019-10-10 NOTE — PLAN OF CARE
10/10/2019      Betozackeryzane Sophie  [address] [address]          [facility]  Department of [facility] Medicine  [address] [id number] [phone] Principal Diagnosis:  Pneumonia of right lower lobe due to infectious organism        Mrs Barrios was under my care from October 6th, 2019 to   October 10th, 2019.  Mrs Barrios can resume normal routine tomorrow.         __________________________  Mai Morales MD  10/10/2019

## 2019-10-10 NOTE — DISCHARGE SUMMARY
"[facility]  Hospital Medicine  Discharge Summary      Patient Name: Car Barrios  MRN: [id number]  Admission Date: 10/6/2019  Hospital Length of Stay: 4 days  Discharge Date and Time: 10/10/2019  1:19 PM  Attending Physician: No att. providers found   Discharging Provider: Mai Mann MD  Primary Care Provider: Adriel Mcbride Jr, MD      HPI:   Per H&P by MAXINE Armstrong:  "Ms. Car Barrios is a 38 y.o. female, with PMH of IDDM-2, HTN, who [place] [facility] [place] on 10/6/19 2/2 chills and body aches x 2 days. She notes associated non-productive cough, myalgias, nausea, low back pain. She denies chest pain, SOB, V/D, dysuria. She endorses medication non-compliance 2/2 insurance issues. Upon presentation she was found to be febrile up to 102.7.F. She was evaluated in the ED, and noted to have elevated lactic acid, hyperglycemia without open anion gap, and normal beta-hydroxybutyrate, and UTI. She was treated with rocephin/azithromycin and admitted to inpatient status."           Hospital Course:   Ms. Barrois was admitted with sepsis due to right lower lobe pneumonia.  On admit, she had fever, tachycardia and lactic acidosis but no shock physiology.  Port score was 59.  She was treated with IV fluids, ceftriaxone and azithromycin as well as with prn anti-tussive medications.  She ran a high fever for awhile and this was associated with a headache that was slow to resolve until she was given a few doses of Fioricet.  On discharge she had completed a course of azithromycin and will be on Augmentin for a few more days.    In regards to her diabetes she has not been able to afford the Basaglar 30 units qhs as her new insurance does not start until next month.  Hb A1c was 14.  While here she was on Levemir 30 units at night with SSI.  Diabetic educator was consulted as patient had not given herself insulin injections without a pen before, and the pens were too expensive for her.  For the " time being she has been prescribed 70/30 (N/R) insulin 15 units twice daily to last until she can resume the Basaglar.  This is a cheaper insulin that can be bought over the counter at [employer].       Consults:   Consults (From admission, onward)        Status Ordering Provider     Inpatient consult to Diabetes educator  Once     Provider:  (Not yet assigned)    Completed NANCY NAYAK            Final Active Diagnoses:    Diagnosis Date Noted POA    PRINCIPAL PROBLEM:  Pneumonia of right lower lobe due to infectious organism [J18.1] 10/06/2019 Yes    Asymptomatic bacteriuria [R82.71] 10/06/2019 Yes    Type 2 diabetes mellitus with hyperglycemia, with long-term current use of insulin [E11.65, Z79.4] 10/06/2019 Not Applicable    Essential hypertension [I10] 10/06/2019 Yes      Problems Resolved During this Admission:       Discharged Condition: stable    Disposition: Home or Self Care    Follow Up:  Follow-up Information     Go to Adriel Mcbride Jr, MD.    Specialty:  Family Medicine  Why:  Follow up in 1-2 weeks.  Contact information:  [address] [facility] [address]  [phone]             [facility].    Why:  Outpatient Services.   Contact information:  Out Patient Pharmacy  [phone]               Patient Instructions:      Diet diabetic     Activity as tolerated         Medications:  Reconciled Home Medications:      Medication List      START taking these medications    amoxicillin-clavulanate 875-125mg 875-125 mg per tablet  Commonly known as:  AUGMENTIN  Take 1 tablet by mouth every 12 (twelve) hours. for 3 days     blood sugar diagnostic Strp  1 each by Misc.(Non-Drug; Combo Route) route 3 (three) times daily with meals.     butalbital-acetaminophen-caffeine -40 mg -40 mg per tablet  Commonly known as:  FIORICET, ESGIC  Take 1 tablet by mouth every 6 (six) hours as needed for Headaches.     insulin NPH-insulin regular  "(70/30) 100 unit/mL (70-30) injection  Commonly known as:  NOVOLIN 70/30  Inject 15 Units into the skin 2 (two) times daily. Obtain from [employer].  Stop using when you can resume Basaglar insulin.     insulin syringe-needle U-100 0.3 mL 30 gauge x 5/16" Syrg  1 Syringe by Misc.(Non-Drug; Combo Route) route 2 (two) times daily.     lancets Misc  1 each by Misc.(Non-Drug; Combo Route) route 3 (three) times daily with meals.        CONTINUE taking these medications    BASAGLAR KWIKPEN U-100 INSULIN glargine 100 units/mL (3mL) SubQ pen  Generic drug:  insulin  Inject 30 Units into the skin every evening.     fluticasone propionate 50 mcg/actuation nasal spray  Commonly known as:  FLONASE  1 spray (50 mcg total) by Each Nostril route 2 (two) times daily.     triamcinolone acetonide 0.025% 0.025 % cream  Commonly known as:  KENALOG  Apply topically 2 (two) times daily.            Time spent on the discharge of patient: <30 minutes  Patient was seen and examined on the date of discharge and determined to be suitable for discharge.         Mai Mann MD  Department of Hospital Medicine  [facility]  "

## 2019-10-10 NOTE — PHYSICIAN QUERY
PT Name: Car Barrios  MR #: [id number]    Physician Query Form - Cause and Effect Relationship Clarification      CDS/: Maria Elena Puente               Contact information:[email or web address]    This form is a permanent document in the medical record.     Query Date: October 10, 2019    By submitting this query, we are merely seeking further clarification of documentation. Please utilize your independent clinical judgment when addressing the question(s) below.    The Medical record contains the following:  Supporting Clinical Findings   Location in record                                                                      Ms. Barrios was admitted with sepsis due to right lower lobe pneumonia                                                                                                                       DS 10/10                                                                     STAPHYLOCOCCUS AUREUS                                                                                                                           Resp cx 10/7         Provider, please clarify if there is any correlation between __sepsis___ and _STAPHYLOCOCCUS AUREUS___.           Are the conditions:      [  ] Due to or associated with each other   [  ] Unrelated to each other   [  ] Other (Please Specify): _________________________   [ x ] Clinically Undetermined

## 2019-10-10 NOTE — PLAN OF CARE
No DC needs from CM perspective.       10/10/19 1029   Final Note   Assessment Type Final Discharge Note   Anticipated Discharge Disposition Home   What phone number can be called within the next 1-3 days to see how you are doing after discharge? [phone]   Hospital Follow Up  Appt(s) scheduled? Yes   Discharge plans and expectations educations in teach back method with documentation complete? Yes   Right Care Referral Info   Post Acute Recommendation No Care

## 2019-10-11 LAB
BACTERIA SPEC AEROBE CULT: ABNORMAL
BACTERIA SPEC AEROBE CULT: ABNORMAL
GRAM STN SPEC: ABNORMAL

## 2019-10-12 LAB
BACTERIA BLD CULT: NORMAL
BACTERIA BLD CULT: NORMAL

## 2019-12-15 ENCOUNTER — HOSPITAL ENCOUNTER (EMERGENCY)
Facility: OTHER | Age: 39
Discharge: HOME | End: 2019-12-15
Attending: EMERGENCY MEDICINE
Payer: MEDICAID

## 2019-12-15 VITALS
BODY MASS INDEX: 39.27 KG/M2 | DIASTOLIC BLOOD PRESSURE: 81 MMHG | SYSTOLIC BLOOD PRESSURE: 141 MMHG | RESPIRATION RATE: 16 BRPM | OXYGEN SATURATION: 99 % | WEIGHT: 230 LBS | HEIGHT: 64 IN | TEMPERATURE: 98 F | HEART RATE: 84 BPM

## 2019-12-15 DIAGNOSIS — R73.9 HYPERGLYCEMIA WITHOUT KETOSIS: ICD-10-CM

## 2019-12-15 DIAGNOSIS — N76.0 ACUTE VAGINITIS: ICD-10-CM

## 2019-12-15 DIAGNOSIS — N39.0 URINARY TRACT INFECTION WITHOUT HEMATURIA, SITE UNSPECIFIED: Primary | ICD-10-CM

## 2019-12-15 LAB
ALLENS TEST: ABNORMAL
ANION GAP SERPL CALC-SCNC: 13 MMOL/L (ref 8–16)
B-HCG UR QL: NEGATIVE
B-OH-BUTYR BLD STRIP-SCNC: 0.1 MMOL/L (ref 0–0.5)
BACTERIA #/AREA URNS HPF: ABNORMAL /HPF
BASOPHILS # BLD AUTO: 0.06 K/UL (ref 0–0.2)
BASOPHILS NFR BLD: 0.9 % (ref 0–1.9)
BILIRUB UR QL STRIP: NEGATIVE
BUN SERPL-MCNC: 9 MG/DL (ref 6–20)
CALCIUM SERPL-MCNC: 9.3 MG/DL (ref 8.7–10.5)
CHLORIDE SERPL-SCNC: 100 MMOL/L (ref 95–110)
CLARITY UR: ABNORMAL
CO2 SERPL-SCNC: 22 MMOL/L (ref 23–29)
COLOR UR: YELLOW
CREAT SERPL-MCNC: 1.1 MG/DL (ref 0.5–1.4)
CTP QC/QA: YES
DELSYS: ABNORMAL
DIFFERENTIAL METHOD: ABNORMAL
EOSINOPHIL # BLD AUTO: 0.2 K/UL (ref 0–0.5)
EOSINOPHIL NFR BLD: 3 % (ref 0–8)
ERYTHROCYTE [DISTWIDTH] IN BLOOD BY AUTOMATED COUNT: 13.9 % (ref 11.5–14.5)
ERYTHROCYTE [SEDIMENTATION RATE] IN BLOOD BY WESTERGREN METHOD: 20 MM/H
EST. GFR  (AFRICAN AMERICAN): >60 ML/MIN/1.73 M^2
EST. GFR  (NON AFRICAN AMERICAN): >60 ML/MIN/1.73 M^2
FIO2: 21
FLOW: 0
GLUCOSE SERPL-MCNC: 427 MG/DL (ref 70–110)
GLUCOSE UR QL STRIP: ABNORMAL
HCO3 UR-SCNC: 23.9 MMOL/L (ref 24–28)
HCT VFR BLD AUTO: 38.9 % (ref 37–48.5)
HGB BLD-MCNC: 12.6 G/DL (ref 12–16)
HGB UR QL STRIP: NEGATIVE
IMM GRANULOCYTES # BLD AUTO: 0.01 K/UL (ref 0–0.04)
IMM GRANULOCYTES NFR BLD AUTO: 0.2 % (ref 0–0.5)
KETONES UR QL STRIP: NEGATIVE
LEUKOCYTE ESTERASE UR QL STRIP: NEGATIVE
LYMPHOCYTES # BLD AUTO: 2.9 K/UL (ref 1–4.8)
LYMPHOCYTES NFR BLD: 45.2 % (ref 18–48)
MCH RBC QN AUTO: 26.9 PG (ref 27–31)
MCHC RBC AUTO-ENTMCNC: 32.4 G/DL (ref 32–36)
MCV RBC AUTO: 83 FL (ref 82–98)
MICROSCOPIC COMMENT: ABNORMAL
MODE: ABNORMAL
MONOCYTES # BLD AUTO: 0.5 K/UL (ref 0.3–1)
MONOCYTES NFR BLD: 7.6 % (ref 4–15)
NEUTROPHILS # BLD AUTO: 2.8 K/UL (ref 1.8–7.7)
NEUTROPHILS NFR BLD: 43.1 % (ref 38–73)
NITRITE UR QL STRIP: NEGATIVE
NRBC BLD-RTO: 0 /100 WBC
PCO2 BLDA: 41 MMHG (ref 35–45)
PH SMN: 7.37 [PH] (ref 7.35–7.45)
PH UR STRIP: 6 [PH] (ref 5–8)
PLATELET # BLD AUTO: 300 K/UL (ref 150–350)
PMV BLD AUTO: 10.9 FL (ref 9.2–12.9)
PO2 BLDA: 60 MMHG (ref 40–60)
POC BE: -1 MMOL/L
POC SATURATED O2: 90 % (ref 95–100)
POCT GLUCOSE: 320 MG/DL (ref 70–110)
POCT GLUCOSE: 353 MG/DL (ref 70–110)
POCT GLUCOSE: 354 MG/DL (ref 70–110)
POTASSIUM SERPL-SCNC: 3.9 MMOL/L (ref 3.5–5.1)
PROT UR QL STRIP: NEGATIVE
RBC # BLD AUTO: 4.69 M/UL (ref 4–5.4)
SAMPLE: ABNORMAL
SITE: ABNORMAL
SODIUM SERPL-SCNC: 135 MMOL/L (ref 136–145)
SP GR UR STRIP: 1.02 (ref 1–1.03)
SP02: 95
SQUAMOUS #/AREA URNS HPF: 3 /HPF
URN SPEC COLLECT METH UR: ABNORMAL
UROBILINOGEN UR STRIP-ACNC: NEGATIVE EU/DL
WBC # BLD AUTO: 6.44 K/UL (ref 3.9–12.7)
WBC #/AREA URNS HPF: >100 /HPF (ref 0–5)
WBC CLUMPS URNS QL MICRO: ABNORMAL
YEAST URNS QL MICRO: ABNORMAL

## 2019-12-15 PROCEDURE — 99283 EMERGENCY DEPT VISIT LOW MDM: CPT | Mod: 25

## 2019-12-15 PROCEDURE — 87147 CULTURE TYPE IMMUNOLOGIC: CPT

## 2019-12-15 PROCEDURE — 82803 BLOOD GASES ANY COMBINATION: CPT

## 2019-12-15 PROCEDURE — 81025 URINE PREGNANCY TEST: CPT | Performed by: EMERGENCY MEDICINE

## 2019-12-15 PROCEDURE — 87086 URINE CULTURE/COLONY COUNT: CPT

## 2019-12-15 PROCEDURE — 80048 BASIC METABOLIC PNL TOTAL CA: CPT

## 2019-12-15 PROCEDURE — 82962 GLUCOSE BLOOD TEST: CPT

## 2019-12-15 PROCEDURE — 82010 KETONE BODYS QUAN: CPT

## 2019-12-15 PROCEDURE — 87088 URINE BACTERIA CULTURE: CPT

## 2019-12-15 PROCEDURE — 63600175 PHARM REV CODE 636 W HCPCS: Performed by: EMERGENCY MEDICINE

## 2019-12-15 PROCEDURE — 96361 HYDRATE IV INFUSION ADD-ON: CPT

## 2019-12-15 PROCEDURE — 81000 URINALYSIS NONAUTO W/SCOPE: CPT

## 2019-12-15 PROCEDURE — 96374 THER/PROPH/DIAG INJ IV PUSH: CPT

## 2019-12-15 PROCEDURE — 85025 COMPLETE CBC W/AUTO DIFF WBC: CPT

## 2019-12-15 PROCEDURE — 96372 THER/PROPH/DIAG INJ SC/IM: CPT

## 2019-12-15 PROCEDURE — 25000003 PHARM REV CODE 250: Performed by: EMERGENCY MEDICINE

## 2019-12-15 PROCEDURE — 99900035 HC TECH TIME PER 15 MIN (STAT)

## 2019-12-15 RX ORDER — ORPHENADRINE CITRATE 30 MG/ML
60 INJECTION INTRAMUSCULAR; INTRAVENOUS
Status: COMPLETED | OUTPATIENT
Start: 2019-12-15 | End: 2019-12-15

## 2019-12-15 RX ORDER — SODIUM CHLORIDE 9 MG/ML
1000 INJECTION, SOLUTION INTRAVENOUS
Status: COMPLETED | OUTPATIENT
Start: 2019-12-15 | End: 2019-12-15

## 2019-12-15 RX ORDER — NITROFURANTOIN 25; 75 MG/1; MG/1
100 CAPSULE ORAL 2 TIMES DAILY
Qty: 10 CAPSULE | Refills: 0 | Status: SHIPPED | OUTPATIENT
Start: 2019-12-15 | End: 2019-12-20

## 2019-12-15 RX ORDER — NITROFURANTOIN 25; 75 MG/1; MG/1
100 CAPSULE ORAL
Status: COMPLETED | OUTPATIENT
Start: 2019-12-15 | End: 2019-12-15

## 2019-12-15 RX ORDER — FLUCONAZOLE 150 MG/1
150 TABLET ORAL
Status: COMPLETED | OUTPATIENT
Start: 2019-12-15 | End: 2019-12-15

## 2019-12-15 RX ORDER — SODIUM CHLORIDE 9 MG/ML
1000 INJECTION, SOLUTION INTRAVENOUS
Status: DISCONTINUED | OUTPATIENT
Start: 2019-12-15 | End: 2019-12-15

## 2019-12-15 RX ADMIN — SODIUM CHLORIDE 1000 ML: 0.9 INJECTION, SOLUTION INTRAVENOUS at 09:12

## 2019-12-15 RX ADMIN — FLUCONAZOLE 150 MG: 150 TABLET ORAL at 10:12

## 2019-12-15 RX ADMIN — INSULIN HUMAN 5 UNITS: 100 INJECTION, SOLUTION PARENTERAL at 11:12

## 2019-12-15 RX ADMIN — SODIUM CHLORIDE 1000 ML: 0.9 INJECTION, SOLUTION INTRAVENOUS at 10:12

## 2019-12-15 RX ADMIN — ORPHENADRINE CITRATE 60 MG: 30 INJECTION INTRAMUSCULAR; INTRAVENOUS at 10:12

## 2019-12-15 RX ADMIN — NITROFURANTOIN (MONOHYDRATE/MACROCRYSTALS) 100 MG: 75; 25 CAPSULE ORAL at 11:12

## 2019-12-16 NOTE — ED PROVIDER NOTES
Encounter Date: 12/15/2019    SCRIBE #1 NOTE: I, Bibiana Bland, am scribing for, and in the presence of,  Dr. Mercado. I have scribed the entire note.       History     Chief Complaint   Patient presents with    Urinary Frequency     with vaginal itching, and increased thirst.      Time patient was seen by the provider: 9:05 PM      The patient is a 39 y.o. female with a past medical history of hypertension and type 2 diabetes mellitus who presents to the ED with a complaint of lower back pain for the past week. The pain is on both sides of her lower back and radiates down to her butt. The pain is worse with movement. She states she woke up, took a deep breath, and has felt it since. She has taken ibuprofen and tylenol with little relief of her symptoms. She also reports increased thirst and urinary frequency for the past 2 weeks. She believes she has a yeast infection as she is having a lot of vaginal itching and it feels similar to her previous yeast infection. She denies any recent change in her medicine. She denies nausea, vomiting, diarrhea, or any other symptoms.       The history is provided by the patient.     Review of patient's allergies indicates:   Allergen Reactions    Asa [aspirin]      Tongue lump up and nauseated     Past Medical History:   Diagnosis Date    Diabetes mellitus     Hypertension      Past Surgical History:   Procedure Laterality Date    ANKLE SURGERY Bilateral      SECTION      HYSTERECTOMY       History reviewed. No pertinent family history.  Social History     Tobacco Use    Smoking status: Never Smoker    Smokeless tobacco: Never Used   Substance Use Topics    Alcohol use: Yes     Comment: socially    Drug use: No     Review of Systems   Constitutional: Negative for chills and fever.        Positive for increased thirst.   HENT: Negative for rhinorrhea and sore throat.    Eyes: Negative for photophobia and visual disturbance.   Respiratory: Negative for cough and  shortness of breath.    Cardiovascular: Negative for chest pain.   Gastrointestinal: Negative for abdominal pain, diarrhea, nausea and vomiting.   Genitourinary: Positive for frequency. Negative for dysuria.        Positive for vaginal itching.   Musculoskeletal: Positive for back pain (lower). Negative for neck pain.   Skin: Negative for pallor.   Neurological: Negative for dizziness and headaches.       Physical Exam     Initial Vitals [12/15/19 2044]   BP Pulse Resp Temp SpO2   (!) 171/100 98 16 98.4 °F (36.9 °C) 99 %      MAP       --         Physical Exam    Nursing note and vitals reviewed.  Constitutional: She appears well-developed and well-nourished.   HENT:   Head: Normocephalic and atraumatic.   Nose: Nose normal.   Eyes: Conjunctivae and EOM are normal. Pupils are equal, round, and reactive to light.   Neck: Normal range of motion. Neck supple.   Cardiovascular: Normal rate, regular rhythm, normal heart sounds and intact distal pulses. Exam reveals no gallop and no friction rub.    No murmur heard.  Pulmonary/Chest: Breath sounds normal. No respiratory distress. She has no wheezes. She has no rhonchi. She has no rales.   Abdominal: Soft. Bowel sounds are normal. She exhibits no distension. There is no tenderness. There is no rebound.   Musculoskeletal:   Diffuse lumbar tenderness to palpation. Negative straight leg raises.   Neurological: She is alert and oriented to person, place, and time. She has normal strength and normal reflexes.   Skin: Skin is warm and dry. No rash noted.         ED Course   Procedures  Labs Reviewed   URINALYSIS, REFLEX TO URINE CULTURE - Abnormal; Notable for the following components:       Result Value    Appearance, UA Hazy (*)     Glucose, UA 3+ (*)     All other components within normal limits    Narrative:     Preferred Collection Type->Urine, Clean Catch   CBC W/ AUTO DIFFERENTIAL - Abnormal; Notable for the following components:    Mean Corpuscular Hemoglobin 26.9 (*)      All other components within normal limits   BASIC METABOLIC PANEL - Abnormal; Notable for the following components:    Sodium 135 (*)     CO2 22 (*)     Glucose 427 (*)     All other components within normal limits   URINALYSIS MICROSCOPIC - Abnormal; Notable for the following components:    WBC, UA >100 (*)     WBC Clumps, UA Few (*)     Bacteria Few (*)     All other components within normal limits    Narrative:     Preferred Collection Type->Urine, Clean Catch   POCT GLUCOSE - Abnormal; Notable for the following components:    POCT Glucose 353 (*)     All other components within normal limits   ISTAT PROCEDURE - Abnormal; Notable for the following components:    POC HCO3 23.9 (*)     POC SATURATED O2 90 (*)     All other components within normal limits   POCT GLUCOSE - Abnormal; Notable for the following components:    POCT Glucose 354 (*)     All other components within normal limits   POCT GLUCOSE - Abnormal; Notable for the following components:    POCT Glucose 320 (*)     All other components within normal limits   CULTURE, URINE   BETA - HYDROXYBUTYRATE, SERUM   POCT URINE PREGNANCY          Imaging Results    None          Medical Decision Making:   History:   Old Medical Records: I decided to obtain old medical records.  Clinical Tests:   Lab Tests: Ordered and Reviewed    Additional MDM:   Comments: 39-year-old diabetic female presents polyuria, polydipsia, and vaginal irritation consistent with previous yeast infections.    Blood glucose greater than 400 on chemistry.  However, labs negative for DKA.  IV fluids given in the emergency department with some improvement in her blood glucose.  She subsequently received 5 units of insulin with further improvement.  In addition she also received Diflucan for a vaginal yeast infection at the cause of her itching and initial dose of Macrobid for UTI.  She was discharged home with a prescription for Macrobid x5 days instructions to follow up with her primary  care doctor for re-evaluation in 3-5 days..          Scribe Attestation:   Scribe #1: I performed the above scribed service and the documentation accurately describes the services I performed. I attest to the accuracy of the note.    Attending Attestation:           Physician Attestation for Scribe:  Physician Attestation Statement for Scribe #1: I, Dr. Mercado, reviewed documentation, as scribed by Bibiana Bland in my presence, and it is both accurate and complete.                               Clinical Impression:       ICD-10-CM ICD-9-CM   1. Urinary tract infection without hematuria, site unspecified N39.0 599.0   2. Hyperglycemia without ketosis R73.9 790.29   3. Acute vaginitis N76.0 616.10         Disposition:   Disposition: Discharged  Condition: Stable                     Jaclyn Mercado MD  12/16/19 0051

## 2019-12-17 LAB — BACTERIA UR CULT: ABNORMAL

## 2020-03-04 ENCOUNTER — HOSPITAL ENCOUNTER (EMERGENCY)
Facility: OTHER | Age: 40
Discharge: HOME | End: 2020-03-04
Attending: EMERGENCY MEDICINE
Payer: MEDICAID

## 2020-03-04 VITALS
RESPIRATION RATE: 18 BRPM | SYSTOLIC BLOOD PRESSURE: 123 MMHG | WEIGHT: 224 LBS | DIASTOLIC BLOOD PRESSURE: 78 MMHG | OXYGEN SATURATION: 98 % | HEIGHT: 64 IN | BODY MASS INDEX: 38.24 KG/M2 | HEART RATE: 85 BPM | TEMPERATURE: 98 F

## 2020-03-04 DIAGNOSIS — Z79.4 TYPE 2 DIABETES MELLITUS WITH HYPERGLYCEMIA, WITH LONG-TERM CURRENT USE OF INSULIN: Primary | ICD-10-CM

## 2020-03-04 DIAGNOSIS — E11.65 TYPE 2 DIABETES MELLITUS WITH HYPERGLYCEMIA, WITH LONG-TERM CURRENT USE OF INSULIN: Primary | ICD-10-CM

## 2020-03-04 DIAGNOSIS — R73.9 HYPERGLYCEMIA: ICD-10-CM

## 2020-03-04 PROBLEM — J18.9 PNEUMONIA OF RIGHT LOWER LOBE DUE TO INFECTIOUS ORGANISM: Status: RESOLVED | Noted: 2019-10-06 | Resolved: 2020-03-04

## 2020-03-04 PROBLEM — R82.71 ASYMPTOMATIC BACTERIURIA: Status: RESOLVED | Noted: 2019-10-06 | Resolved: 2020-03-04

## 2020-03-04 LAB
ALBUMIN SERPL BCP-MCNC: 3.8 G/DL (ref 3.5–5.2)
ALLENS TEST: ABNORMAL
ALP SERPL-CCNC: 116 U/L (ref 55–135)
ALT SERPL W/O P-5'-P-CCNC: 35 U/L (ref 10–44)
ANION GAP SERPL CALC-SCNC: 14 MMOL/L (ref 8–16)
AST SERPL-CCNC: 28 U/L (ref 10–40)
B-OH-BUTYR BLD STRIP-SCNC: 0.1 MMOL/L (ref 0–0.5)
BACTERIA #/AREA URNS HPF: ABNORMAL /HPF
BASOPHILS # BLD AUTO: 0.08 K/UL (ref 0–0.2)
BASOPHILS NFR BLD: 1.1 % (ref 0–1.9)
BILIRUB SERPL-MCNC: 0.3 MG/DL (ref 0.1–1)
BILIRUB UR QL STRIP: NEGATIVE
BUN SERPL-MCNC: 14 MG/DL (ref 6–20)
CALCIUM SERPL-MCNC: 10.2 MG/DL (ref 8.7–10.5)
CHLORIDE SERPL-SCNC: 95 MMOL/L (ref 95–110)
CLARITY UR: CLEAR
CO2 SERPL-SCNC: 24 MMOL/L (ref 23–29)
COLOR UR: YELLOW
CREAT SERPL-MCNC: 1.1 MG/DL (ref 0.5–1.4)
DELSYS: ABNORMAL
DIFFERENTIAL METHOD: ABNORMAL
EOSINOPHIL # BLD AUTO: 0.2 K/UL (ref 0–0.5)
EOSINOPHIL NFR BLD: 2.4 % (ref 0–8)
ERYTHROCYTE [DISTWIDTH] IN BLOOD BY AUTOMATED COUNT: 13.5 % (ref 11.5–14.5)
EST. GFR  (AFRICAN AMERICAN): >60 ML/MIN/1.73 M^2
EST. GFR  (NON AFRICAN AMERICAN): >60 ML/MIN/1.73 M^2
GLUCOSE SERPL-MCNC: 488 MG/DL (ref 70–110)
GLUCOSE SERPL-MCNC: >500 MG/DL (ref 70–110)
GLUCOSE UR QL STRIP: ABNORMAL
HCO3 UR-SCNC: 30.6 MMOL/L (ref 24–28)
HCT VFR BLD AUTO: 43.5 % (ref 37–48.5)
HGB BLD-MCNC: 14 G/DL (ref 12–16)
HGB UR QL STRIP: ABNORMAL
IMM GRANULOCYTES # BLD AUTO: 0.02 K/UL (ref 0–0.04)
IMM GRANULOCYTES NFR BLD AUTO: 0.3 % (ref 0–0.5)
KETONES UR QL STRIP: NEGATIVE
LEUKOCYTE ESTERASE UR QL STRIP: NEGATIVE
LYMPHOCYTES # BLD AUTO: 3 K/UL (ref 1–4.8)
LYMPHOCYTES NFR BLD: 42.1 % (ref 18–48)
MCH RBC QN AUTO: 26.4 PG (ref 27–31)
MCHC RBC AUTO-ENTMCNC: 32.2 G/DL (ref 32–36)
MCV RBC AUTO: 82 FL (ref 82–98)
MICROSCOPIC COMMENT: ABNORMAL
MODE: ABNORMAL
MONOCYTES # BLD AUTO: 0.6 K/UL (ref 0.3–1)
MONOCYTES NFR BLD: 8.1 % (ref 4–15)
NEUTROPHILS # BLD AUTO: 3.2 K/UL (ref 1.8–7.7)
NEUTROPHILS NFR BLD: 46 % (ref 38–73)
NITRITE UR QL STRIP: NEGATIVE
NRBC BLD-RTO: 0 /100 WBC
PCO2 BLDA: 54.9 MMHG (ref 35–45)
PH SMN: 7.36 [PH] (ref 7.35–7.45)
PH UR STRIP: 6 [PH] (ref 5–8)
PLATELET # BLD AUTO: 324 K/UL (ref 150–350)
PMV BLD AUTO: 10.5 FL (ref 9.2–12.9)
PO2 BLDA: 25 MMHG (ref 40–60)
POC BE: 5 MMOL/L
POC SATURATED O2: 42 % (ref 95–100)
POCT GLUCOSE: 277 MG/DL (ref 70–110)
POCT GLUCOSE: 386 MG/DL (ref 70–110)
POTASSIUM SERPL-SCNC: 4 MMOL/L (ref 3.5–5.1)
PROT SERPL-MCNC: 8.1 G/DL (ref 6–8.4)
PROT UR QL STRIP: NEGATIVE
RBC # BLD AUTO: 5.31 M/UL (ref 4–5.4)
RBC #/AREA URNS HPF: 2 /HPF (ref 0–4)
SAMPLE: ABNORMAL
SITE: ABNORMAL
SODIUM SERPL-SCNC: 133 MMOL/L (ref 136–145)
SP GR UR STRIP: 1.01 (ref 1–1.03)
SQUAMOUS #/AREA URNS HPF: 2 /HPF
URN SPEC COLLECT METH UR: ABNORMAL
UROBILINOGEN UR STRIP-ACNC: NEGATIVE EU/DL
WBC # BLD AUTO: 7.03 K/UL (ref 3.9–12.7)
WBC #/AREA URNS HPF: 12 /HPF (ref 0–5)
YEAST URNS QL MICRO: ABNORMAL

## 2020-03-04 PROCEDURE — 63600175 PHARM REV CODE 636 W HCPCS: Performed by: EMERGENCY MEDICINE

## 2020-03-04 PROCEDURE — 96360 HYDRATION IV INFUSION INIT: CPT

## 2020-03-04 PROCEDURE — 87147 CULTURE TYPE IMMUNOLOGIC: CPT

## 2020-03-04 PROCEDURE — 85025 COMPLETE CBC W/AUTO DIFF WBC: CPT

## 2020-03-04 PROCEDURE — 93010 EKG 12-LEAD: ICD-10-PCS | Mod: ,,, | Performed by: INTERNAL MEDICINE

## 2020-03-04 PROCEDURE — 93005 ELECTROCARDIOGRAM TRACING: CPT

## 2020-03-04 PROCEDURE — 96361 HYDRATE IV INFUSION ADD-ON: CPT

## 2020-03-04 PROCEDURE — 82010 KETONE BODYS QUAN: CPT

## 2020-03-04 PROCEDURE — 87086 URINE CULTURE/COLONY COUNT: CPT

## 2020-03-04 PROCEDURE — 99284 EMERGENCY DEPT VISIT MOD MDM: CPT | Mod: 25

## 2020-03-04 PROCEDURE — 82803 BLOOD GASES ANY COMBINATION: CPT

## 2020-03-04 PROCEDURE — 81000 URINALYSIS NONAUTO W/SCOPE: CPT

## 2020-03-04 PROCEDURE — 99900035 HC TECH TIME PER 15 MIN (STAT)

## 2020-03-04 PROCEDURE — 82962 GLUCOSE BLOOD TEST: CPT

## 2020-03-04 PROCEDURE — 93010 ELECTROCARDIOGRAM REPORT: CPT | Mod: ,,, | Performed by: INTERNAL MEDICINE

## 2020-03-04 PROCEDURE — 87088 URINE BACTERIA CULTURE: CPT

## 2020-03-04 PROCEDURE — 80053 COMPREHEN METABOLIC PANEL: CPT

## 2020-03-04 RX ORDER — INSULIN ASPART 100 [IU]/ML
6 INJECTION, SOLUTION INTRAVENOUS; SUBCUTANEOUS
COMMUNITY
Start: 2020-03-03

## 2020-03-04 RX ORDER — METFORMIN HYDROCHLORIDE 500 MG/1
500 TABLET ORAL
COMMUNITY
Start: 2020-03-03 | End: 2021-03-03

## 2020-03-04 RX ORDER — LOSARTAN POTASSIUM AND HYDROCHLOROTHIAZIDE 12.5; 5 MG/1; MG/1
1 TABLET ORAL
COMMUNITY
Start: 2020-02-28

## 2020-03-04 RX ADMIN — SODIUM CHLORIDE 1000 ML: 0.9 INJECTION, SOLUTION INTRAVENOUS at 04:03

## 2020-03-04 RX ADMIN — SODIUM CHLORIDE 1000 ML: 0.9 INJECTION, SOLUTION INTRAVENOUS at 06:03

## 2020-03-04 NOTE — ED PROVIDER NOTES
"Encounter Date: 3/4/2020    SCRIBE #1 NOTE: I, Dalton Rojas, am scribing for, and in the presence of, Dr. Hernandez.       History     Chief Complaint   Patient presents with    Dehydration     Pt. states she is dehydrated and has been running a high blood sugar with last check being 546 at 1330. Pt. has been trying to drink water but it has not been helping. She reports feeling groggy and fatigued.      Time seen by provider: 5:21 PM    This is a 39 y.o. female who presents with complaint of dehydration. She is also experiencing polyuria, polydipsia, a mild cough, and fatigue. The patient reports that she hasn't been taking her insulin in a year secondary to not having insurance. She has recently been placed back on Medicaid and started taking her insulin a few days ago. She reports that her blood sugar has been running in the "500's". She last had her insulin when she was admitted to the ER for pneumonia in October. She reports recently being around her grandchildren who have been sick. She denies fever, chest pain, shortness of breath, abdominal pain, nausea, vomiting, and diarrhea.     The history is provided by the patient.     Review of patient's allergies indicates:   Allergen Reactions    Asa [aspirin]      Tongue lump up and nauseated     Past Medical History:   Diagnosis Date    Diabetes mellitus     Hypertension      Past Surgical History:   Procedure Laterality Date    ANKLE SURGERY Bilateral      SECTION      HYSTERECTOMY       No family history on file.  Social History     Tobacco Use    Smoking status: Never Smoker    Smokeless tobacco: Never Used   Substance Use Topics    Alcohol use: Yes     Comment: socially    Drug use: No     ROS: As per HPI and below:   General: No fever. Notes fatigue.  HENT: No facial pain.   Eyes: Negative for eye pain.   Cardiovascular: No chest pain.   Respiratory: No dyspnea. Notes a mild cough.  GI: No abdominal pain. No nausea. No vomiting. No diarrhea. " "  Skin: No rashes.   Endocrine: Notes polydipsia and polyuria.  Neuro: No syncope. No focal deficits.   Musculoskeletal: No extremity pain.  All other systems reviewed and are negative.    Physical Exam     Initial Vitals [03/04/20 1426]   BP Pulse Resp Temp SpO2   (!) 133/93 102 16 98.6 °F (37 °C) 97 %      MAP       --         Nursing note and vitals reviewed.  /78 (BP Location: Right arm, Patient Position: Sitting)   Pulse 85   Temp 98.4 °F (36.9 °C) (Oral)   Resp 18   Ht 5' 4" (1.626 m)   Wt 101.6 kg (224 lb)   SpO2 98%   BMI 38.45 kg/m²   Constitutional: AAOx3. No distress. Obese.  Eyes: EOMI. No discharge. Anicteric.  HENT:   Mouth/Throat: Oropharynx is clear. Uvula midline. Mucus membranes are dry.  Neck: Normal range of motion. Neck supple.  Cardiovascular: Normal rate. No murmur, no gallop and no friction rub heard.   Pulmonary/Chest: No respiratory distress. Effort normal. No wheezes, no rales, no rhonchi.   Abdominal: Bowel sounds normal. Soft. No distension and no mass. There is no tenderness. There is no rebound, no guarding, no tenderness at McBurney's point.  : polyuria. No dysuria.   Endo: Polydipsia, hyperglycemia.   Musculoskeletal: Normal range of motion.   Neurological: GCS 15. Alert and oriented to person, place, and time. No gross cranial nerve, light touch or strength deficit. Coordination normal.   Skin: Skin is warm and dry.   EXT: 2+ radial pulses.   Psychiatric: Behavior is normal. Judgment normal.      ED Course   Procedures  Labs Reviewed   CBC W/ AUTO DIFFERENTIAL - Abnormal; Notable for the following components:       Result Value    Mean Corpuscular Hemoglobin 26.4 (*)     All other components within normal limits   COMPREHENSIVE METABOLIC PANEL - Abnormal; Notable for the following components:    Sodium 133 (*)     Glucose 488 (*)     All other components within normal limits    Narrative:      glu  critical result(s) called and verbal readback obtained from   Pau" Edson SANTANA. by [facility] 03/04/2020 17:12   URINALYSIS, REFLEX TO URINE CULTURE - Abnormal; Notable for the following components:    Glucose, UA 3+ (*)     Occult Blood UA Trace (*)     All other components within normal limits    Narrative:     Preferred Collection Type->Urine, Clean Catch   URINALYSIS MICROSCOPIC - Abnormal; Notable for the following components:    WBC, UA 12 (*)     All other components within normal limits    Narrative:     Preferred Collection Type->Urine, Clean Catch   ISTAT PROCEDURE - Abnormal; Notable for the following components:    POC PCO2 54.9 (*)     POC PO2 25 (*)     POC HCO3 30.6 (*)     POC SATURATED O2 42 (*)     All other components within normal limits   POCT GLUCOSE - Abnormal; Notable for the following components:    POCT Glucose 386 (*)     All other components within normal limits   POCT GLUCOSE - Abnormal; Notable for the following components:    POCT Glucose 277 (*)     All other components within normal limits   CULTURE, URINE   BETA - HYDROXYBUTYRATE, SERUM   POCT GLUCOSE MONITORING CONTINUOUS   POCT GLUCOSE MONITORING CONTINUOUS     EKG Readings: (Independently Interpreted)   I independently reviewed and interpreted EKG which shows normal sinus rhythm at 92 beats per minute, no STEMI, no ischemic changes, normal intervals.  No acute change compared to prior tracing.       Imaging Results          X-Ray Chest AP Portable (Final result)  Result time 03/04/20 16:13:42    Final result by Fatimah Niño MD (03/04/20 16:13:42)                 Impression:      No acute finding      Electronically signed by: Fatimah Niño MD  Date:    03/04/2020  Time:    16:13             Narrative:    EXAMINATION:  XR CHEST AP PORTABLE    CLINICAL HISTORY:  hyperglycemia;    TECHNIQUE:  Single frontal view of the chest was performed.    COMPARISON:  October 6 2019    FINDINGS:  there is a diminished inspiration.  The heart size is not enlarged.  There is no increase in pulmonary vascularity.   No large volume of pleural fluid is present on this single view.  The osseous structures are intact.  Lungs are clear.                              X-Rays:   Independently Interpreted Readings:   Chest X-Ray: I independently reviewed and interpreted CXR which shows no pneumothorax, no focal consolidation, no cardiomegaly, no acute process.     Medical Decision Making:   History:   Old Medical Records: I decided to obtain old medical records.  Independently Interpreted Test(s):   I have ordered and independently interpreted X-rays - see prior notes.  I have ordered and independently interpreted EKG Reading(s) - see prior notes  Clinical Tests:   Lab Tests: Ordered and Reviewed  Radiological Study: Ordered and Reviewed  Medical Tests: Ordered and Reviewed            Scribe Attestation:   Scribe #1: I performed the above scribed service and the documentation accurately describes the services I performed. I attest to the accuracy of the note.    Attending Attestation:           Physician Attestation for Scribe:  Physician Attestation Statement for Scribe #1: I, Dr. Hernandez, reviewed documentation, as scribed by Dalton Rojas in my presence, and it is both accurate and complete.                 ED Course as of Mar 04 1950   Wed Mar 04, 2020   1628 I independently reviewed and interpreted EKG which shows normal sinus tachycardia rhythm at 102 beats per minute, no STEMI, no ischemic changes, normal intervals.  No acute change compared to prior tracing.        [RC]   1711 Patient is a 39-year-old female with hypertension, diabetes, obesity, who presents with polyuria, polydipsia is over last several days in the setting of being out of her insulin for about 6 weeks.  Patient restarted insulin about 1 day ago.  She denies any fevers.  She notes mild cough with multiple sick contacts, no recent travel, no COVID 19 positive contacts.  The initial differential included hyperglycemia, diabetes, dehydration, gross metabolic  abnormality however the patient's symptoms are likely due to her prolonged lapse in insulin use.  I independently reviewed and interpreted labs which are notable for hyperglycemia without DKA, unremarkable CBC.  Patient restarted insulin in the past few days.  I discussed with patient and/or guardian/caretaker that this evaluation in the ED does not suggest any emergent or life threatening medical condition requiring admission or immediate intervention beyond what was provided in the ED. Regardless, an unremarkable evaluation in the ED does not preclude the development or presence of a serious or life threatening condition. As such, patient was instructed to return immediately for any worsening or change in current symptoms.     I had a detailed discussion with patient  and/or guardian/caretaker regarding findings, plan, return precautions, importance of medication adherence, need to follow-up with a PCP. All questions answered.     Note was created using voice recognition software. It may have occasional typographical errors not identified and edited despite initial review prior to signing.    [RC]      ED Course User Index  [RC] Sebastián Hernandez MD                Clinical Impression:     1. Type 2 diabetes mellitus with hyperglycemia, with long-term current use of insulin    2. Hyperglycemia                ED Disposition Condition    Discharge Good        ED Prescriptions     None        Follow-up Information     Follow up With Specialties Details Why Contact Info    Adriel Mcbride Jr., MD Family Medicine In 1 day For recheck with your primary care doctor [id number]  [facility] [id number] [phone]                                       Sebastián Hernandez MD  03/04/20 1950

## 2020-03-04 NOTE — DISCHARGE INSTRUCTIONS
Call your primary care doctor to make the first available appointment.     Keep all your medical appointments.     Take your regular medication as prescribed. Contact your primary care provider before running out of medication, or for any problems obtaining them.    Do not drive or operate heavy machinery while taking opioid or muscle relaxing medications. Examples include norco, percocet, xanax, valium, flexeril.     Overuse or long term use of pain and sedating medication may lead to addiction, dependence, organ failure, family and work problems, legal problems, accidental overdose and death.    If you do not have health insurance, you probably qualify for heavily discounted rates:  Call [phone] ([facility] hotline) or go to www.[employer].[place].gov    Your evaluation in the ED does not suggest any emergent or life threatening medical condition requiring admission or immediate intervention beyond that provided in the ED.   However, the signs of a serious problem sometimes take more time to appear.   RETURN TO THE ER if any of the following occur:    Weakness, dizziness, fainting, or loss of consciousness   Fever of 100.4ºF (38ºC) or higher  Any worse symptoms  Any new or concerning symptoms

## 2020-03-04 NOTE — ED TRIAGE NOTES
Pt reports was off all her medications for a year due to insurance reasons. Just restarted medications yesterday. C/o nausea, dry mouth, frequent urination

## 2020-03-05 LAB — BACTERIA UR CULT: ABNORMAL

## 2020-03-09 ENCOUNTER — SSC ENCOUNTER (OUTPATIENT)
Dept: ADMINISTRATIVE | Facility: OTHER | Age: 40
End: 2020-03-09

## 2020-03-09 NOTE — PROGRESS NOTES
Please note the following patient's information was forwarded to the Medicaid ([place] Medicaid,  [employer], [employer], [facility], [facility], [facility]) Case Management office.    Please contact Outpatient Complex Care Management at ext [phone] with any questions.    Thank you,    Renetta Gilbert, [facility]  Outpatient Case Mgmnt  [phone]

## 2020-03-10 LAB — POCT GLUCOSE: >500 MG/DL (ref 70–110)

## 2020-09-23 ENCOUNTER — HOSPITAL ENCOUNTER (EMERGENCY)
Facility: OTHER | Age: 40
Discharge: HOME | End: 2020-09-24
Attending: EMERGENCY MEDICINE
Payer: MEDICAID

## 2020-09-23 VITALS
OXYGEN SATURATION: 97 % | HEIGHT: 64 IN | SYSTOLIC BLOOD PRESSURE: 111 MMHG | RESPIRATION RATE: 18 BRPM | HEART RATE: 103 BPM | BODY MASS INDEX: 38.45 KG/M2 | TEMPERATURE: 100 F | DIASTOLIC BLOOD PRESSURE: 59 MMHG

## 2020-09-23 DIAGNOSIS — U07.1 COVID-19 VIRUS INFECTION: Primary | ICD-10-CM

## 2020-09-23 DIAGNOSIS — R73.9 HYPERGLYCEMIA: ICD-10-CM

## 2020-09-23 DIAGNOSIS — R79.89 ELEVATED LACTIC ACID LEVEL: ICD-10-CM

## 2020-09-23 DIAGNOSIS — U07.1 COVID-19 VIRUS DETECTED: ICD-10-CM

## 2020-09-23 LAB
ALBUMIN SERPL BCP-MCNC: 4.1 G/DL (ref 3.5–5.2)
ALLENS TEST: ABNORMAL
ALP SERPL-CCNC: 115 U/L (ref 55–135)
ALT SERPL W/O P-5'-P-CCNC: 49 U/L (ref 10–44)
ANION GAP SERPL CALC-SCNC: 12 MMOL/L (ref 8–16)
AST SERPL-CCNC: 50 U/L (ref 10–40)
B-OH-BUTYR BLD STRIP-SCNC: 0.2 MMOL/L (ref 0–0.5)
BASOPHILS # BLD AUTO: 0.06 K/UL (ref 0–0.2)
BASOPHILS NFR BLD: 1.2 % (ref 0–1.9)
BILIRUB SERPL-MCNC: 0.5 MG/DL (ref 0.1–1)
BILIRUB UR QL STRIP: NEGATIVE
BUN SERPL-MCNC: 10 MG/DL (ref 6–20)
CALCIUM SERPL-MCNC: 9.8 MG/DL (ref 8.7–10.5)
CHLORIDE SERPL-SCNC: 98 MMOL/L (ref 95–110)
CK SERPL-CCNC: 82 U/L (ref 20–180)
CLARITY UR: CLEAR
CO2 SERPL-SCNC: 22 MMOL/L (ref 23–29)
COLOR UR: YELLOW
CREAT SERPL-MCNC: 1 MG/DL (ref 0.5–1.4)
CRP SERPL-MCNC: 51.5 MG/L (ref 0–8.2)
CTP QC/QA: YES
DELSYS: ABNORMAL
DIFFERENTIAL METHOD: ABNORMAL
EOSINOPHIL # BLD AUTO: 0.1 K/UL (ref 0–0.5)
EOSINOPHIL NFR BLD: 1 % (ref 0–8)
ERYTHROCYTE [DISTWIDTH] IN BLOOD BY AUTOMATED COUNT: 14.3 % (ref 11.5–14.5)
ERYTHROCYTE [SEDIMENTATION RATE] IN BLOOD BY WESTERGREN METHOD: 20 MM/H
EST. GFR  (AFRICAN AMERICAN): >60 ML/MIN/1.73 M^2
EST. GFR  (NON AFRICAN AMERICAN): >60 ML/MIN/1.73 M^2
FIO2: 21
FLOW: 0
GLUCOSE SERPL-MCNC: 392 MG/DL (ref 70–110)
GLUCOSE UR QL STRIP: ABNORMAL
HCO3 UR-SCNC: 23 MMOL/L (ref 24–28)
HCT VFR BLD AUTO: 46.2 % (ref 37–48.5)
HCV AB SERPL QL IA: NEGATIVE
HGB BLD-MCNC: 15.1 G/DL (ref 12–16)
HGB UR QL STRIP: NEGATIVE
HIV 1+2 AB+HIV1 P24 AG SERPL QL IA: NEGATIVE
IMM GRANULOCYTES # BLD AUTO: 0.03 K/UL (ref 0–0.04)
IMM GRANULOCYTES NFR BLD AUTO: 0.6 % (ref 0–0.5)
KETONES UR QL STRIP: ABNORMAL
LACTATE SERPL-SCNC: 1.7 MMOL/L (ref 0.5–2.2)
LACTATE SERPL-SCNC: 2.7 MMOL/L (ref 0.5–2.2)
LDH SERPL L TO P-CCNC: 307 U/L (ref 110–260)
LEUKOCYTE ESTERASE UR QL STRIP: NEGATIVE
LYMPHOCYTES # BLD AUTO: 0.9 K/UL (ref 1–4.8)
LYMPHOCYTES NFR BLD: 17.3 % (ref 18–48)
MAGNESIUM SERPL-MCNC: 1.7 MG/DL (ref 1.6–2.6)
MCH RBC QN AUTO: 27.3 PG (ref 27–31)
MCHC RBC AUTO-ENTMCNC: 32.7 G/DL (ref 32–36)
MCV RBC AUTO: 84 FL (ref 82–98)
MODE: ABNORMAL
MONOCYTES # BLD AUTO: 0.6 K/UL (ref 0.3–1)
MONOCYTES NFR BLD: 11.4 % (ref 4–15)
NEUTROPHILS # BLD AUTO: 3.4 K/UL (ref 1.8–7.7)
NEUTROPHILS NFR BLD: 68.5 % (ref 38–73)
NITRITE UR QL STRIP: NEGATIVE
NRBC BLD-RTO: 0 /100 WBC
PCO2 BLDA: 35.2 MMHG (ref 35–45)
PH SMN: 7.42 [PH] (ref 7.35–7.45)
PH UR STRIP: 8.5 [PH] (ref 5–8)
PHOSPHATE SERPL-MCNC: 1.5 MG/DL (ref 2.7–4.5)
PLATELET # BLD AUTO: 285 K/UL (ref 150–350)
PMV BLD AUTO: 10.7 FL (ref 9.2–12.9)
PO2 BLDA: 33 MMHG (ref 40–60)
POC BE: -1 MMOL/L
POC SATURATED O2: 65 % (ref 95–100)
POCT GLUCOSE: 274 MG/DL (ref 70–110)
POCT GLUCOSE: 316 MG/DL (ref 70–110)
POCT GLUCOSE: 334 MG/DL (ref 70–110)
POTASSIUM SERPL-SCNC: 4.7 MMOL/L (ref 3.5–5.1)
PROT SERPL-MCNC: 8.7 G/DL (ref 6–8.4)
PROT UR QL STRIP: NEGATIVE
RBC # BLD AUTO: 5.53 M/UL (ref 4–5.4)
SAMPLE: ABNORMAL
SARS-COV-2 RDRP RESP QL NAA+PROBE: POSITIVE
SITE: ABNORMAL
SODIUM SERPL-SCNC: 132 MMOL/L (ref 136–145)
SP GR UR STRIP: 1.01 (ref 1–1.03)
SP02: 0
URN SPEC COLLECT METH UR: ABNORMAL
UROBILINOGEN UR STRIP-ACNC: NEGATIVE EU/DL
WBC # BLD AUTO: 5.02 K/UL (ref 3.9–12.7)

## 2020-09-23 PROCEDURE — 93010 ELECTROCARDIOGRAM REPORT: CPT | Mod: ,,, | Performed by: INTERNAL MEDICINE

## 2020-09-23 PROCEDURE — 87040 BLOOD CULTURE FOR BACTERIA: CPT

## 2020-09-23 PROCEDURE — 99285 EMERGENCY DEPT VISIT HI MDM: CPT | Mod: 25

## 2020-09-23 PROCEDURE — 25000003 PHARM REV CODE 250: Performed by: EMERGENCY MEDICINE

## 2020-09-23 PROCEDURE — U0002 COVID-19 LAB TEST NON-CDC: HCPCS | Performed by: EMERGENCY MEDICINE

## 2020-09-23 PROCEDURE — 84100 ASSAY OF PHOSPHORUS: CPT

## 2020-09-23 PROCEDURE — 93010 EKG 12-LEAD: ICD-10-PCS | Mod: ,,, | Performed by: INTERNAL MEDICINE

## 2020-09-23 PROCEDURE — 93005 ELECTROCARDIOGRAM TRACING: CPT

## 2020-09-23 PROCEDURE — 82803 BLOOD GASES ANY COMBINATION: CPT

## 2020-09-23 PROCEDURE — 80053 COMPREHEN METABOLIC PANEL: CPT

## 2020-09-23 PROCEDURE — 96361 HYDRATE IV INFUSION ADD-ON: CPT

## 2020-09-23 PROCEDURE — 86703 HIV-1/HIV-2 1 RESULT ANTBDY: CPT

## 2020-09-23 PROCEDURE — 63600175 PHARM REV CODE 636 W HCPCS: Performed by: EMERGENCY MEDICINE

## 2020-09-23 PROCEDURE — 83735 ASSAY OF MAGNESIUM: CPT

## 2020-09-23 PROCEDURE — 36415 COLL VENOUS BLD VENIPUNCTURE: CPT

## 2020-09-23 PROCEDURE — 96374 THER/PROPH/DIAG INJ IV PUSH: CPT

## 2020-09-23 PROCEDURE — 82010 KETONE BODYS QUAN: CPT

## 2020-09-23 PROCEDURE — 82728 ASSAY OF FERRITIN: CPT

## 2020-09-23 PROCEDURE — 85025 COMPLETE CBC W/AUTO DIFF WBC: CPT

## 2020-09-23 PROCEDURE — 86803 HEPATITIS C AB TEST: CPT

## 2020-09-23 PROCEDURE — 99900035 HC TECH TIME PER 15 MIN (STAT)

## 2020-09-23 PROCEDURE — 82962 GLUCOSE BLOOD TEST: CPT

## 2020-09-23 PROCEDURE — 81003 URINALYSIS AUTO W/O SCOPE: CPT

## 2020-09-23 PROCEDURE — 83615 LACTATE (LD) (LDH) ENZYME: CPT

## 2020-09-23 PROCEDURE — 82550 ASSAY OF CK (CPK): CPT

## 2020-09-23 PROCEDURE — 86140 C-REACTIVE PROTEIN: CPT

## 2020-09-23 PROCEDURE — 83605 ASSAY OF LACTIC ACID: CPT

## 2020-09-23 RX ORDER — SODIUM,POTASSIUM PHOSPHATES 280-250MG
1 POWDER IN PACKET (EA) ORAL ONCE
Status: COMPLETED | OUTPATIENT
Start: 2020-09-23 | End: 2020-09-23

## 2020-09-23 RX ORDER — ACETAMINOPHEN 325 MG/1
650 TABLET ORAL
Status: COMPLETED | OUTPATIENT
Start: 2020-09-23 | End: 2020-09-23

## 2020-09-23 RX ORDER — BENZONATATE 100 MG/1
100 CAPSULE ORAL 3 TIMES DAILY PRN
Qty: 20 CAPSULE | Refills: 0 | Status: SHIPPED | OUTPATIENT
Start: 2020-09-23 | End: 2020-10-03

## 2020-09-23 RX ADMIN — SODIUM CHLORIDE 1000 ML: 0.9 INJECTION, SOLUTION INTRAVENOUS at 06:09

## 2020-09-23 RX ADMIN — SODIUM CHLORIDE 1000 ML: 0.9 INJECTION, SOLUTION INTRAVENOUS at 08:09

## 2020-09-23 RX ADMIN — POTASSIUM & SODIUM PHOSPHATES POWDER PACK 280-160-250 MG 1 PACKET: 280-160-250 PACK at 09:09

## 2020-09-23 RX ADMIN — INSULIN HUMAN 6 UNITS: 100 INJECTION, SOLUTION PARENTERAL at 08:09

## 2020-09-23 RX ADMIN — ACETAMINOPHEN 650 MG: 325 TABLET, FILM COATED ORAL at 09:09

## 2020-09-23 NOTE — ED PROVIDER NOTES
Encounter Date: 2020    SCRIBE #1 NOTE: I, Dalton Rojas, am scribing for, and in the presence of, Dr. Loaiza.       History     Chief Complaint   Patient presents with    Hyperglycemia     pt reports , reports polyuria and polydyspisa. PMH DKA. pt febrile upon arrival      Time seen by provider: 6:25 PM    This is a 39 y.o. female with a history of HTN and IDDM who presents with complaint of generalized body aches that began this morning, but worsened approximately two hours ago. She is also experiencing chills, cramping to her feet, cough, and rhinorrhea. She reports having pneumonia a year ago, with similar symptoms. EMS reports that the patient's blood sugar was 359. The patient states that she only takes her insulin at night. She denies smoking, drinking, and illicit drug use. She denies nausea, vomiting, diarrhea, and abdominal pain.     The history is provided by the patient.     Review of patient's allergies indicates:   Allergen Reactions    Asa [aspirin]      Tongue lump up and nauseated     Past Medical History:   Diagnosis Date    Diabetes mellitus     Hypertension      Past Surgical History:   Procedure Laterality Date    ANKLE SURGERY Bilateral      SECTION      HYSTERECTOMY       No family history on file.  Social History     Tobacco Use    Smoking status: Never Smoker    Smokeless tobacco: Never Used   Substance Use Topics    Alcohol use: Yes     Comment: socially    Drug use: No     Review of Systems   Constitutional: Positive for chills. Negative for fever.   HENT: Positive for rhinorrhea. Negative for congestion and sore throat.    Eyes: Negative for visual disturbance.   Respiratory: Positive for cough. Negative for shortness of breath.    Cardiovascular: Negative for chest pain and palpitations.   Gastrointestinal: Negative for abdominal pain, diarrhea, nausea and vomiting.   Genitourinary: Negative for decreased urine volume, dysuria and vaginal discharge.    Musculoskeletal: Positive for myalgias. Negative for joint swelling, neck pain and neck stiffness.        Positive for cramping to bilateral feet.   Skin: Negative for rash and wound.   Neurological: Negative for weakness, numbness and headaches.   Psychiatric/Behavioral: Negative for confusion.       Physical Exam     Initial Vitals [09/23/20 1805]   BP Pulse Resp Temp SpO2   (!) 150/104 110 16 100.2 °F (37.9 °C) 97 %      MAP       --         Physical Exam    Nursing note and vitals reviewed.  Constitutional: She appears well-developed and well-nourished. She appears distressed.   HENT:   Head: Normocephalic and atraumatic.   Mouth/Throat: Oropharynx is clear and moist. No oropharyngeal exudate.   Eyes: Conjunctivae and EOM are normal. Pupils are equal, round, and reactive to light.   Neck: Neck supple.   Cardiovascular: Normal heart sounds.   No murmur heard.  Mild tachycardia present.   Pulmonary/Chest: Breath sounds normal. No respiratory distress. She has no wheezes. She has no rhonchi. She has no rales.   Abdominal: Soft. There is no abdominal tenderness. There is no rebound and no guarding.   Musculoskeletal: No tenderness or edema.   Neurological: She is alert and oriented to person, place, and time. She has normal strength. GCS score is 15. GCS eye subscore is 4. GCS verbal subscore is 5. GCS motor subscore is 6.   Skin: Skin is warm and dry. No rash noted.   Psychiatric: She has a normal mood and affect. Thought content normal.         ED Course   Procedures  Labs Reviewed   CBC W/ AUTO DIFFERENTIAL - Abnormal; Notable for the following components:       Result Value    RBC 5.53 (*)     Immature Granulocytes 0.6 (*)     Lymph # 0.9 (*)     Lymph% 17.3 (*)     All other components within normal limits   COMPREHENSIVE METABOLIC PANEL - Abnormal; Notable for the following components:    Sodium 132 (*)     CO2 22 (*)     Glucose 392 (*)     Total Protein 8.7 (*)     AST 50 (*)     ALT 49 (*)     All other  components within normal limits   URINALYSIS, REFLEX TO URINE CULTURE - Abnormal; Notable for the following components:    Glucose, UA 2+ (*)     Ketones, UA Trace (*)     All other components within normal limits    Narrative:     Specimen Source->Urine   PHOSPHORUS - Abnormal; Notable for the following components:    Phosphorus 1.5 (*)     All other components within normal limits   LACTIC ACID, PLASMA - Abnormal; Notable for the following components:    Lactate (Lactic Acid) 2.7 (*)     All other components within normal limits   C-REACTIVE PROTEIN - Abnormal; Notable for the following components:    CRP 51.5 (*)     All other components within normal limits   LACTATE DEHYDROGENASE - Abnormal; Notable for the following components:     (*)     All other components within normal limits   FERRITIN - Abnormal; Notable for the following components:    Ferritin 714 (*)     All other components within normal limits   SARS-COV-2 RDRP GENE - Abnormal; Notable for the following components:    POC Rapid COVID Positive (*)     All other components within normal limits   ISTAT PROCEDURE - Abnormal; Notable for the following components:    POC PO2 33 (*)     POC HCO3 23.0 (*)     POC SATURATED O2 65 (*)     All other components within normal limits   POCT GLUCOSE - Abnormal; Notable for the following components:    POCT Glucose 334 (*)     All other components within normal limits   POCT GLUCOSE - Abnormal; Notable for the following components:    POCT Glucose 274 (*)     All other components within normal limits   POCT GLUCOSE - Abnormal; Notable for the following components:    POCT Glucose 316 (*)     All other components within normal limits   CULTURE, BLOOD   CULTURE, BLOOD   HIV 1 / 2 ANTIBODY   HEPATITIS C ANTIBODY   BETA - HYDROXYBUTYRATE, SERUM   MAGNESIUM   CK   C-REACTIVE PROTEIN   FERRITIN   LACTATE DEHYDROGENASE   CK   LACTIC ACID, PLASMA     EKG Readings: (Independently Interpreted)   Initial Reading: No  STEMI. Rhythm: Sinus Tachycardia. Heart Rate: 105. Ectopy: No Ectopy.     ECG Results          EKG 12-lead (Final result)  Result time 09/24/20 11:21:14    Final result by Interface, Lab In [facility] (09/24/20 11:21:14)                 Narrative:    Test Reason : R73.9,    Vent. Rate : 105 BPM     Atrial Rate : 105 BPM     P-R Int : 140 ms          QRS Dur : 074 ms      QT Int : 338 ms       P-R-T Axes : 030 003 032 degrees     QTc Int : 446 ms    Sinus tachycardia  Cannot rule out Anterior infarct ,age undetermined  Abnormal ECG    Confirmed by Jakob WAITE, Juan ANGULO ([id number]) on 9/24/2020 11:21:07 AM    Referred By: [id number]   SELF           Confirmed By:Juan Robert MD                            Imaging Results          X-Ray Chest AP Portable (Final result)  Result time 09/23/20 19:11:26    Final result by Sivan Haynes MD (09/23/20 19:11:26)                 Impression:      Hypoinflated lungs.  No acute cardiopulmonary process identified.      Electronically signed by: Sivan Haynes MD  Date:    09/23/2020  Time:    19:11             Narrative:    EXAMINATION:  XR CHEST AP PORTABLE    CLINICAL HISTORY:  hyperglycemia;    TECHNIQUE:  Single frontal view of the chest was performed.    COMPARISON:  03/04/2020.    FINDINGS:  Cardiac silhouette is normal in size.  Lungs are hypoinflated with elevation of the right hemidiaphragm seen.  No evidence of focal consolidative process, pneumothorax, or significant pleural effusion.  No acute osseous abnormality identified.                              X-Rays:   Independently Interpreted Readings:   Chest X-Ray: Limited by portable technique and poor inspiratory effort. No infiltrate, effusion, or PTX. Will defer to radiology.     Medical Decision Making:   History:   Old Medical Records: I decided to obtain old medical records.  Independently Interpreted Test(s):   I have ordered and independently interpreted X-rays - see prior notes.  I have ordered and independently  interpreted EKG Reading(s) - see prior notes  Clinical Tests:   Lab Tests: Ordered and Reviewed  Radiological Study: Ordered and Reviewed  Medical Tests: Ordered and Reviewed  ED Management:  Emergent evaluation a 39-year-old female who presents with complaint of myalgias, associated URI symptoms, hyperglycemia.  Vital signs reveal elevated temperature at 100.2°, but afebrile.  Room air pulse oximetry is normal.  Physical exam reveals an obese woman in mild distress, no increased work of breathing.  Blood sugar is elevated, but no evidence of DKA.  Rapid COVID test is positive with no pneumonia noted on chest x-ray.  Lactic acid is elevated.  She was hydrated with IV fluids, treated with insulin, and phosphorus was repleted.  Review of previous lab value show significant elevation in hemoglobin A1c, and I suspect patient has chronic hyperglycemia.  I discussed the case with Dr. Thompson at [facility] and the transfer center and patient was accepted for transfer for hydration and repeat lactic acid.  Patient had prolonged ED course awaiting transfer, and a 4 hr repeat lactic acid was performed here at [facility] [place] operated by [facility] and returned to within normal limits.  Patient decided she did not want to be transferred and preferred discharge.  I am comfortable with this plan given improvement and labs and no pneumonia or need for supplemental oxygen even with ambulatory pulse ox.  Ultimately she is discharged in improved condition with diagnosis of COVID-19.  She is encouraged to follow-up with PCP and also given strict return precautions.            Scribe Attestation:   Scribe #1: I performed the above scribed service and the documentation accurately describes the services I performed. I attest to the accuracy of the note.    Attending Attestation:           Physician Attestation for Scribe:  Physician Attestation Statement for Scribe #1: I, Dr. Loaiza, reviewed documentation, as scribed by Dalton Rojas in my presence, and it is both accurate  and complete.                 ED Course as of Sep 27 2140   Wed Sep 23, 2020   2156 Discussed the case with Dr. Thompson, who will accept the patient for transfer    [MM]      ED Course User Index  [MM] Dalton Rojas            Clinical Impression:     ICD-10-CM ICD-9-CM   1. COVID-19 virus infection  U07.1    2. Hyperglycemia  R73.9 790.29   3. Elevated lactic acid level  R79.89 276.2                       ED Disposition Condition    Discharge Stable                           Shannan Loaiza MD  09/27/20 2143

## 2020-09-24 LAB — FERRITIN SERPL-MCNC: 714 NG/ML (ref 20–300)

## 2020-09-24 NOTE — ED NOTES
Pt to ED via EMS with c/o full body aches since this AM. Pt reports fever and chills today. Temp in ED. Pt reports cramping in BLE. Pt reports cough and congestion since this AM. Pt reports history of DM. Elevated CBG per EMS. CB in ED. Pt reports urinary frequency and increased thirst. Pt denies any other urinary symptoms. Pt denies CP, SOB, n/v/d. AAOX4. Tachypnea noted. Pt attached to cardiac monitor, pulse ox and BP cycled. Will continue to monitor.

## 2020-09-24 NOTE — PLAN OF CARE
(Physician in Lead of Transfers)   Outside Transfer Acceptance Note / Regional Referral Center      Upon patient arrival to floor, please call extension [phone] (if no answer, this will flip to a beeper, so enter your call back number) for Hospital Medicine admit team assignment and for additional admit orders for the patient.  Do not page the attending physician associated with the patient on arrival (this physician may not be on duty at the time of arrival).  Rather, always call [phone] to reach the triage physician for orders and team assignment.      Transferring Physician: Dr. Loaiza    Accepting Physician: Ron Thompson MD    Date of Acceptance: 09/23/2020    Transferring Facility: [facility]    Reason for Transfer: covid +     Report from Transferring Physician/Hospital course: The patient is a 38 y/o female with PMH of HTN and DM who presented with diffuse myalgias worsening over the course of the day. Also reports productive cough and chills. BS noted to be 359. Possible rigors, 100.2 and tachy. Received IVF and some insulin IV. LA 2.7, , CRP 51.5. Not in DKA. CXR negative and not on O2. Repeat LA pending.       Labs & Radiographs: see EPIC      To Do List:   1) Admit to obs with covid + protocols  2) Supportive care       Ron Thompson MD  Hospital Medicine Staff

## 2020-09-24 NOTE — ED NOTES
"Pt states "I am actually feeling so much better and I'm not hurting like I was. I think I feel okay enough to go home". MD at bedside and aware  "

## 2020-09-24 NOTE — ED NOTES
Pt AAOx4, resting comfortably in bed, NAD, respirations E/UL, updated on POC, wheels locked and in low position, call bell with in reach, Comfort positioning and restroom needs were addressed. Necessary items were placed with in reach and was advised when a reassessment would take place. Pt given sandwich. Will continue to monitor

## 2020-09-24 NOTE — ED NOTES
Pt ambulating around room with no issue. Oxygen saturation is 100% at this time. Pt is calling a ride

## 2020-09-29 LAB
BACTERIA BLD CULT: NORMAL
BACTERIA BLD CULT: NORMAL

## 2021-07-10 ENCOUNTER — HOSPITAL ENCOUNTER (EMERGENCY)
Facility: OTHER | Age: 41
Discharge: HOME | End: 2021-07-10
Attending: EMERGENCY MEDICINE
Payer: MEDICAID

## 2021-07-10 VITALS
HEART RATE: 93 BPM | TEMPERATURE: 98 F | SYSTOLIC BLOOD PRESSURE: 155 MMHG | BODY MASS INDEX: 36.54 KG/M2 | DIASTOLIC BLOOD PRESSURE: 91 MMHG | WEIGHT: 214 LBS | RESPIRATION RATE: 20 BRPM | OXYGEN SATURATION: 97 % | HEIGHT: 64 IN

## 2021-07-10 DIAGNOSIS — J06.9 VIRAL URI WITH COUGH: Primary | ICD-10-CM

## 2021-07-10 LAB
CTP QC/QA: YES
SARS-COV-2 RDRP RESP QL NAA+PROBE: NEGATIVE

## 2021-07-10 PROCEDURE — 99283 EMERGENCY DEPT VISIT LOW MDM: CPT | Mod: 25

## 2021-07-10 PROCEDURE — U0002 COVID-19 LAB TEST NON-CDC: HCPCS | Performed by: EMERGENCY MEDICINE

## 2021-07-10 RX ORDER — GUAIFENESIN 600 MG/1
1200 TABLET, EXTENDED RELEASE ORAL 2 TIMES DAILY
Qty: 40 TABLET | Refills: 0 | Status: SHIPPED | OUTPATIENT
Start: 2021-07-10 | End: 2021-07-20

## 2021-12-09 ENCOUNTER — HOSPITAL ENCOUNTER (EMERGENCY)
Facility: OTHER | Age: 41
Discharge: HOME | End: 2021-12-09
Attending: EMERGENCY MEDICINE
Payer: MEDICAID

## 2021-12-09 VITALS
DIASTOLIC BLOOD PRESSURE: 82 MMHG | HEART RATE: 99 BPM | WEIGHT: 214 LBS | TEMPERATURE: 99 F | BODY MASS INDEX: 36.73 KG/M2 | SYSTOLIC BLOOD PRESSURE: 132 MMHG | OXYGEN SATURATION: 99 % | RESPIRATION RATE: 18 BRPM

## 2021-12-09 DIAGNOSIS — B96.89 BACTERIAL VAGINOSIS: Primary | ICD-10-CM

## 2021-12-09 DIAGNOSIS — J02.9 VIRAL PHARYNGITIS: ICD-10-CM

## 2021-12-09 DIAGNOSIS — N76.0 BACTERIAL VAGINOSIS: Primary | ICD-10-CM

## 2021-12-09 LAB
BACTERIA #/AREA URNS HPF: NORMAL /HPF
BACTERIA GENITAL QL WET PREP: ABNORMAL
BILIRUB UR QL STRIP: NEGATIVE
CLARITY UR: CLEAR
CLUE CELLS VAG QL WET PREP: ABNORMAL
COLOR UR: YELLOW
CTP QC/QA: YES
CTP QC/QA: YES
FILAMENT FUNGI VAG WET PREP-#/AREA: ABNORMAL
GLUCOSE UR QL STRIP: ABNORMAL
GROUP A STREP, MOLECULAR: NEGATIVE
HGB UR QL STRIP: NEGATIVE
KETONES UR QL STRIP: ABNORMAL
LEUKOCYTE ESTERASE UR QL STRIP: NEGATIVE
MICROSCOPIC COMMENT: NORMAL
NITRITE UR QL STRIP: NEGATIVE
PH UR STRIP: 6 [PH] (ref 5–8)
POC MOLECULAR INFLUENZA A AGN: NEGATIVE
POC MOLECULAR INFLUENZA B AGN: NEGATIVE
PROT UR QL STRIP: NEGATIVE
RBC #/AREA URNS HPF: 0 /HPF (ref 0–4)
SARS-COV-2 RDRP RESP QL NAA+PROBE: NEGATIVE
SP GR UR STRIP: 1.01 (ref 1–1.03)
SPECIMEN SOURCE: ABNORMAL
SQUAMOUS #/AREA URNS HPF: 1 /HPF
T VAGINALIS GENITAL QL WET PREP: ABNORMAL
URN SPEC COLLECT METH UR: ABNORMAL
UROBILINOGEN UR STRIP-ACNC: NEGATIVE EU/DL
WBC #/AREA URNS HPF: 1 /HPF (ref 0–5)
WBC #/AREA VAG WET PREP: ABNORMAL
YEAST GENITAL QL WET PREP: ABNORMAL
YEAST URNS QL MICRO: NORMAL

## 2021-12-09 PROCEDURE — 87491 CHLMYD TRACH DNA AMP PROBE: CPT | Performed by: PHYSICIAN ASSISTANT

## 2021-12-09 PROCEDURE — 87502 INFLUENZA DNA AMP PROBE: CPT

## 2021-12-09 PROCEDURE — 81000 URINALYSIS NONAUTO W/SCOPE: CPT | Performed by: EMERGENCY MEDICINE

## 2021-12-09 PROCEDURE — 87210 SMEAR WET MOUNT SALINE/INK: CPT | Performed by: PHYSICIAN ASSISTANT

## 2021-12-09 PROCEDURE — 99284 EMERGENCY DEPT VISIT MOD MDM: CPT | Mod: 25

## 2021-12-09 PROCEDURE — 87651 STREP A DNA AMP PROBE: CPT | Performed by: EMERGENCY MEDICINE

## 2021-12-09 PROCEDURE — U0002 COVID-19 LAB TEST NON-CDC: HCPCS | Performed by: EMERGENCY MEDICINE

## 2021-12-09 PROCEDURE — 87591 N.GONORRHOEAE DNA AMP PROB: CPT | Performed by: PHYSICIAN ASSISTANT

## 2021-12-09 RX ORDER — METRONIDAZOLE 500 MG/1
500 TABLET ORAL EVERY 12 HOURS
Qty: 14 TABLET | Refills: 0 | Status: SHIPPED | OUTPATIENT
Start: 2021-12-09 | End: 2021-12-16

## 2021-12-14 LAB
C TRACH DNA SPEC QL NAA+PROBE: NOT DETECTED
N GONORRHOEA DNA SPEC QL NAA+PROBE: NOT DETECTED

## 2023-08-09 ENCOUNTER — HOSPITAL ENCOUNTER (EMERGENCY)
Facility: OTHER | Age: 43
Discharge: HOME | End: 2023-08-09
Attending: EMERGENCY MEDICINE
Payer: MEDICAID

## 2023-08-09 VITALS
HEART RATE: 87 BPM | BODY MASS INDEX: 39.27 KG/M2 | SYSTOLIC BLOOD PRESSURE: 142 MMHG | RESPIRATION RATE: 20 BRPM | OXYGEN SATURATION: 97 % | HEIGHT: 64 IN | TEMPERATURE: 98 F | DIASTOLIC BLOOD PRESSURE: 88 MMHG | WEIGHT: 230 LBS

## 2023-08-09 DIAGNOSIS — R05.9 COUGH: ICD-10-CM

## 2023-08-09 DIAGNOSIS — J06.9 VIRAL URI WITH COUGH: Primary | ICD-10-CM

## 2023-08-09 LAB
CTP QC/QA: YES
CTP QC/QA: YES
POC MOLECULAR INFLUENZA A AGN: NEGATIVE
POC MOLECULAR INFLUENZA B AGN: NEGATIVE
SARS-COV-2 RDRP RESP QL NAA+PROBE: NEGATIVE

## 2023-08-09 PROCEDURE — 99283 EMERGENCY DEPT VISIT LOW MDM: CPT | Mod: 25

## 2023-08-09 PROCEDURE — 87635 SARS-COV-2 COVID-19 AMP PRB: CPT

## 2023-08-09 PROCEDURE — 25000003 PHARM REV CODE 250

## 2023-08-09 RX ORDER — BENZONATATE 100 MG/1
100 CAPSULE ORAL 3 TIMES DAILY PRN
Qty: 21 CAPSULE | Refills: 0 | Status: SHIPPED | OUTPATIENT
Start: 2023-08-09 | End: 2023-08-16

## 2023-08-09 RX ORDER — PSEUDOEPHEDRINE HYDROCHLORIDE 60 MG/1
30 TABLET ORAL EVERY 4 HOURS PRN
Qty: 21 TABLET | Refills: 0 | Status: SHIPPED | OUTPATIENT
Start: 2023-08-09 | End: 2023-08-16

## 2023-08-09 RX ORDER — GUAIFENESIN 600 MG/1
1200 TABLET, EXTENDED RELEASE ORAL
Status: COMPLETED | OUTPATIENT
Start: 2023-08-09 | End: 2023-08-09

## 2023-08-09 RX ADMIN — GUAIFENESIN 1200 MG: 600 TABLET, EXTENDED RELEASE ORAL at 09:08

## 2023-08-10 NOTE — ED TRIAGE NOTES
Reports cold-like symptoms for 1 week, no improvement with OTC cough meds, (+) productive cough (cloudy sputum)/nasal congestion/chest congestion/SOB with activity)

## 2023-08-10 NOTE — ED PROVIDER NOTES
Encounter Date: 2023       History     Chief Complaint   Patient presents with    Cough     Productive cough X 1 week with chest congestion.    Shortness of Breath     Car Barrios is a 42 y.o. female with history of HTN and DM presenting to the emergency department for evaluation of productive cough for over a week. She also reports associated nasal congestion, rhinorrhea, chest congestion, and SOB with exertion. She reports recent contact with family members who had similar symptoms. She denies improvement of symptoms with Nyquil or Mucinex at home. She has not been experiencing fever, chills, headaches, dizziness, sore throat, chest pain, palpitations, leg swelling, abdominal pain, nausea, vomiting, diarrhea, urinary symptoms, vaginal bleeding, or vaginal discharge.    The history is provided by the patient.     Review of patient's allergies indicates:   Allergen Reactions    Asa [aspirin]      Tongue lump up and nauseated     Past Medical History:   Diagnosis Date    Diabetes mellitus     Hypertension      Past Surgical History:   Procedure Laterality Date    ANKLE SURGERY Bilateral      SECTION      HYSTERECTOMY       History reviewed. No pertinent family history.  Social History     Tobacco Use    Smoking status: Never    Smokeless tobacco: Never   Substance Use Topics    Alcohol use: Yes     Comment: socially    Drug use: No     Review of Systems   Constitutional:  Negative for chills and fever.   HENT:  Positive for congestion and rhinorrhea. Negative for sore throat.    Respiratory:  Positive for cough and shortness of breath.    Cardiovascular:  Negative for chest pain, palpitations and leg swelling.   Gastrointestinal:  Negative for abdominal pain, diarrhea, nausea and vomiting.   Genitourinary:  Negative for dysuria, frequency, urgency, vaginal bleeding and vaginal discharge.   Musculoskeletal:  Negative for back pain.   Skin:  Negative for rash.   Neurological:  Negative for dizziness  and headaches.   Psychiatric/Behavioral:  Negative for confusion.        Physical Exam     Initial Vitals [08/09/23 1917]   BP Pulse Resp Temp SpO2   (!) 144/89 91 18 98.3 °F (36.8 °C) 98 %      MAP       --         Physical Exam    Nursing note and vitals reviewed.  Constitutional: She appears well-developed and well-nourished. No distress.   HENT:   Head: Normocephalic and atraumatic.   Nose: Nose normal.   Mouth/Throat: Oropharynx is clear and moist.   Eyes: Conjunctivae and EOM are normal.   Neck: Neck supple.   Normal range of motion.  Cardiovascular:  Normal rate, regular rhythm, normal heart sounds and intact distal pulses.           Pulmonary/Chest: Breath sounds normal. No respiratory distress. She has no wheezes. She has no rhonchi. She has no rales. She exhibits no tenderness.   Musculoskeletal:         General: No edema. Normal range of motion.      Cervical back: Normal range of motion and neck supple.     Neurological: She is alert and oriented to person, place, and time. She has normal strength.   Skin: Skin is warm and dry. No rash noted.   Psychiatric: She has a normal mood and affect. Her behavior is normal. Judgment and thought content normal.         ED Course   Procedures  Labs Reviewed   SARS-COV-2 RDRP GENE   POCT INFLUENZA A/B MOLECULAR          Imaging Results              X-Ray Chest PA And Lateral (Final result)  Result time 08/09/23 21:43:35      Final result by Sivan Haynes MD (08/09/23 21:43:35)                   Impression:      No acute cardiopulmonary process identified.      Electronically signed by: Sivan Haynes MD  Date:    08/09/2023  Time:    21:43               Narrative:    EXAMINATION:  XR CHEST PA AND LATERAL    CLINICAL HISTORY:  Cough, unspecified    TECHNIQUE:  PA and lateral views of the chest were performed.    COMPARISON:  September 2020.    FINDINGS:  Cardiac silhouette is normal in size.  Lungs are symmetrically expanded.  No evidence of focal consolidative  process, pneumothorax, or significant pleural effusion.  No acute osseous abnormality identified.                                    X-Rays:   Independently Interpreted Readings:   Chest X-Ray: No infiltrate, effusion, or pneumothorax. No cardiomegaly.      Medications   guaiFENesin 12 hr tablet 1,200 mg (1,200 mg Oral Given 8/9/23 2124)     Medical Decision Making:   Initial Assessment:   Urgent evaluation of 42-year-old female presents with productive cough, nasal congestion, chest congestion rhinorrhea shortness and breath with exertion.  Reports recent sick contacts with family members.  She denies fever, headaches, dizziness chest pain, palpitations, GI symptoms, urinary symptoms, vaginal bleeding, or vaginal discharge.  She is well-appearing and nontoxic.  She is hypertensive.  She is afebrile.  Will check rapid COVID and influenza tests.  Will give Mucinex.  Clinical Tests:   Lab Tests: Ordered and Reviewed  Radiological Study: Ordered and Reviewed  ED Management:  Rapid COVID and influenza tests are negative.  No acute process on chest x-ray.  I updated the patient on results.  Explained to her that she is likely experiencing viral URI with cough.  Will discharge home with Tessalon Perles and Sudafed.  Recommended she continue to stay hydrated with fluids containing electrolytes and to rest.  Patient verbalized understanding and agreement with this plan of care. She was given specific return precautions. Advised to follow up with PCP as needed. All questions and concerns addressed. She is stable for discharge.                           Clinical Impression:   Final diagnoses:  [R05.9] Cough  [J06.9] Viral URI with cough (Primary)        ED Disposition Condition    Discharge Stable          ED Prescriptions       Medication Sig Dispense Start Date End Date Auth. Provider    benzonatate (TESSALON) 100 MG capsule Take 1 capsule (100 mg total) by mouth 3 (three) times daily as needed for Cough. 21 capsule  8/9/2023 8/16/2023 Gary Chavez PA-C    pseudoephedrine (SUDAFED) 60 MG tablet Take 0.5 tablets (30 mg total) by mouth every 4 (four) hours as needed for Congestion. 21 tablet 8/9/2023 8/16/2023 Gary Chavez PA-C          Follow-up Information       Follow up With Specialties Details Why Contact Info    Adriel Mcbride Jr., MD Hospitalist, Family Medicine  As needed, If symptoms worsen [address]  [address] [facility] [place] [address]  [phone]               Gary Chavez PA-C  08/10/23 9736

## 2023-08-10 NOTE — DISCHARGE INSTRUCTIONS
Please take Tessalon Perles as needed for cough.  Please take Sudafed as needed for congestion.  Make sure you stay hydrated.  Please alternate Tylenol and ibuprofen as needed for fever, headache, and pain.

## 2023-08-10 NOTE — FIRST PROVIDER EVALUATION
"Medical screening examination initiated.  I have conducted a focused provider triage encounter, findings are as follows:    Brief history of present illness:  42 y.o female with productive cough x1 week. + sinus congestion and drainage. Taking OTC cough syrup without alleviation. Granddaughter was sick about a month ago and the rest of her family had been sick after that. States it is difficult to take deep breaths. No fever, chills, nightsweats, or chest pain    Vitals:    08/09/23 1917   BP: (!) 144/89   BP Location: Left arm   Patient Position: Sitting   Pulse: 91   Resp: 18   Temp: 98.3 °F (36.8 °C)   TempSrc: Oral   SpO2: 98%   Weight: 104.3 kg (230 lb)   Height: 5' 4" (1.626 m)       Pertinent physical exam:  VSS, afebrile. Well-appearing. Lungs CTA bilaterally without wheezing or rhonchi.    Brief workup plan:  Swab for covid and flu, CXR, guaifenesin     Preliminary workup initiated; this workup will be continued and followed by the physician or advanced practice provider that is assigned to the patient when roomed.  "

## 2023-09-11 ENCOUNTER — HOSPITAL ENCOUNTER (EMERGENCY)
Facility: OTHER | Age: 43
Discharge: HOME | End: 2023-09-11
Attending: EMERGENCY MEDICINE
Payer: MEDICAID

## 2023-09-11 VITALS
OXYGEN SATURATION: 99 % | RESPIRATION RATE: 16 BRPM | DIASTOLIC BLOOD PRESSURE: 89 MMHG | HEIGHT: 64 IN | BODY MASS INDEX: 39.27 KG/M2 | TEMPERATURE: 98 F | HEART RATE: 99 BPM | WEIGHT: 230 LBS | SYSTOLIC BLOOD PRESSURE: 153 MMHG

## 2023-09-11 DIAGNOSIS — R52 PAIN: ICD-10-CM

## 2023-09-11 DIAGNOSIS — M54.9 UPPER BACK PAIN: ICD-10-CM

## 2023-09-11 DIAGNOSIS — B34.9 VIRAL SYNDROME: ICD-10-CM

## 2023-09-11 DIAGNOSIS — M79.10 MYALGIA: Primary | ICD-10-CM

## 2023-09-11 LAB
ALBUMIN SERPL BCP-MCNC: 3.2 G/DL (ref 3.5–5.2)
ALP SERPL-CCNC: 64 U/L (ref 55–135)
ALT SERPL W/O P-5'-P-CCNC: 15 U/L (ref 10–44)
ANION GAP SERPL CALC-SCNC: 10 MMOL/L (ref 8–16)
AST SERPL-CCNC: 14 U/L (ref 10–40)
BASOPHILS # BLD AUTO: 0.06 K/UL (ref 0–0.2)
BASOPHILS NFR BLD: 0.8 % (ref 0–1.9)
BILIRUB SERPL-MCNC: 0.4 MG/DL (ref 0.1–1)
BILIRUB UR QL STRIP: NEGATIVE
BUN SERPL-MCNC: 8 MG/DL (ref 6–20)
CALCIUM SERPL-MCNC: 8.7 MG/DL (ref 8.7–10.5)
CHLORIDE SERPL-SCNC: 105 MMOL/L (ref 95–110)
CLARITY UR: CLEAR
CO2 SERPL-SCNC: 22 MMOL/L (ref 23–29)
COLOR UR: YELLOW
CREAT SERPL-MCNC: 0.7 MG/DL (ref 0.5–1.4)
DIFFERENTIAL METHOD: ABNORMAL
EOSINOPHIL # BLD AUTO: 0.3 K/UL (ref 0–0.5)
EOSINOPHIL NFR BLD: 3.7 % (ref 0–8)
ERYTHROCYTE [DISTWIDTH] IN BLOOD BY AUTOMATED COUNT: 14.7 % (ref 11.5–14.5)
EST. GFR  (NO RACE VARIABLE): >60 ML/MIN/1.73 M^2
GLUCOSE SERPL-MCNC: 108 MG/DL (ref 70–110)
GLUCOSE UR QL STRIP: NEGATIVE
HCT VFR BLD AUTO: 37.6 % (ref 37–48.5)
HCV AB SERPL QL IA: NEGATIVE
HGB BLD-MCNC: 11.8 G/DL (ref 12–16)
HGB UR QL STRIP: NEGATIVE
HIV 1+2 AB+HIV1 P24 AG SERPL QL IA: NEGATIVE
IMM GRANULOCYTES # BLD AUTO: 0.04 K/UL (ref 0–0.04)
IMM GRANULOCYTES NFR BLD AUTO: 0.5 % (ref 0–0.5)
INFLUENZA A, MOLECULAR: NEGATIVE
INFLUENZA B, MOLECULAR: NEGATIVE
KETONES UR QL STRIP: NEGATIVE
LEUKOCYTE ESTERASE UR QL STRIP: NEGATIVE
LYMPHOCYTES # BLD AUTO: 3 K/UL (ref 1–4.8)
LYMPHOCYTES NFR BLD: 39.1 % (ref 18–48)
MCH RBC QN AUTO: 26.3 PG (ref 27–31)
MCHC RBC AUTO-ENTMCNC: 31.4 G/DL (ref 32–36)
MCV RBC AUTO: 84 FL (ref 82–98)
MONOCYTES # BLD AUTO: 0.6 K/UL (ref 0.3–1)
MONOCYTES NFR BLD: 7.2 % (ref 4–15)
NEUTROPHILS # BLD AUTO: 3.8 K/UL (ref 1.8–7.7)
NEUTROPHILS NFR BLD: 48.7 % (ref 38–73)
NITRITE UR QL STRIP: NEGATIVE
NRBC BLD-RTO: 0 /100 WBC
PH UR STRIP: 7 [PH] (ref 5–8)
PLATELET # BLD AUTO: 298 K/UL (ref 150–450)
PMV BLD AUTO: 10.2 FL (ref 9.2–12.9)
POTASSIUM SERPL-SCNC: 3.8 MMOL/L (ref 3.5–5.1)
PROT SERPL-MCNC: 6.3 G/DL (ref 6–8.4)
PROT UR QL STRIP: NEGATIVE
RBC # BLD AUTO: 4.49 M/UL (ref 4–5.4)
SODIUM SERPL-SCNC: 137 MMOL/L (ref 136–145)
SP GR UR STRIP: 1.02 (ref 1–1.03)
SPECIMEN SOURCE: NORMAL
URN SPEC COLLECT METH UR: NORMAL
UROBILINOGEN UR STRIP-ACNC: NEGATIVE EU/DL
WBC # BLD AUTO: 7.77 K/UL (ref 3.9–12.7)

## 2023-09-11 PROCEDURE — 25000003 PHARM REV CODE 250: Performed by: PHYSICIAN ASSISTANT

## 2023-09-11 PROCEDURE — 80053 COMPREHEN METABOLIC PANEL: CPT | Performed by: PHYSICIAN ASSISTANT

## 2023-09-11 PROCEDURE — 99284 EMERGENCY DEPT VISIT MOD MDM: CPT | Mod: 25

## 2023-09-11 PROCEDURE — 85025 COMPLETE CBC W/AUTO DIFF WBC: CPT | Performed by: PHYSICIAN ASSISTANT

## 2023-09-11 PROCEDURE — 87502 INFLUENZA DNA AMP PROBE: CPT | Performed by: PHYSICIAN ASSISTANT

## 2023-09-11 PROCEDURE — 86803 HEPATITIS C AB TEST: CPT | Performed by: EMERGENCY MEDICINE

## 2023-09-11 PROCEDURE — 87389 HIV-1 AG W/HIV-1&-2 AB AG IA: CPT | Performed by: EMERGENCY MEDICINE

## 2023-09-11 PROCEDURE — 81003 URINALYSIS AUTO W/O SCOPE: CPT | Performed by: PHYSICIAN ASSISTANT

## 2023-09-11 RX ORDER — KETOROLAC TROMETHAMINE 10 MG/1
10 TABLET, FILM COATED ORAL
Status: COMPLETED | OUTPATIENT
Start: 2023-09-11 | End: 2023-09-11

## 2023-09-11 RX ADMIN — KETOROLAC TROMETHAMINE 10 MG: 10 TABLET, FILM COATED ORAL at 01:09

## 2023-09-11 NOTE — FIRST PROVIDER EVALUATION
Emergency Department TeleTriage Encounter Note      CHIEF COMPLAINT    Chief Complaint   Patient presents with    Generalized Body Aches     Pt reporting body aches with chills and minor SOB starting last night        VITAL SIGNS   Initial Vitals [09/11/23 1008]   BP Pulse Resp Temp SpO2   (!) 177/87 80 20 98 °F (36.7 °C) 97 %      MAP       --            ALLERGIES    Review of patient's allergies indicates:   Allergen Reactions    Asa [aspirin]      Tongue lump up and nauseated       PROVIDER TRIAGE NOTE  This is a teletriage evaluation of a 42 y.o. female presenting to the ED complaining of body aches. Patient reports body aches, chills, cough, and congestion since last night. She denies fever.     Patient is alert and oriented. She speaks in complete sentences. She is sitting upright in the chair in no distress.     Initial orders will be placed and care will be transferred to an alternate provider when patient is roomed for a full evaluation. Any additional orders and the final disposition will be determined by that provider.         ORDERS  Labs Reviewed   INFLUENZA A & B BY MOLECULAR   HIV 1 / 2 ANTIBODY   HEPATITIS C ANTIBODY   SARS-COV-2 RNA AMPLIFICATION, QUAL       ED Orders (720h ago, onward)      Start Ordered     Status Ordering Provider    09/11/23 1040 09/11/23 1039  COVID-19 Rapid Screening  STAT         Ordered EUGENIA VASQUEZ    09/11/23 1040 09/11/23 1039  Airborne and Contact and Droplet Isolation Status  Continuous         Ordered EUGENIA VASQUEZ.    09/11/23 1040 09/11/23 1039  Influenza A & B by Molecular  STAT         Ordered EUGENIA VASQUEZ    09/11/23 1010 09/11/23 1009  HIV 1/2 Ag/Ab (4th Gen)  STAT         Ordered CIELO NAGY.    09/11/23 1010 09/11/23 1009  Hepatitis C Antibody  STAT         Ordered CIELO NAGY              Virtual Visit Note: The provider triage portion of this emergency department evaluation and documentation was performed via [employer], a HIPAA-compliant  telemedicine application, in concert with a tele-presenter in the room. A face to face patient evaluation with one of my colleagues will occur once the patient is placed in an emergency department room.      DISCLAIMER: This note was prepared with [employer] voice recognition transcription software. Garbled syntax, mangled pronouns, and other bizarre constructions may be attributed to that software system.

## 2023-09-11 NOTE — ED PROVIDER NOTES
Encounter Date: 2023       History     Chief Complaint   Patient presents with    Generalized Body Aches     Pt reporting body aches with chills and minor SOB starting last night      Afebrile 42-year-old female with PMH of insulin-dependent diabetes mellitus, HTN, and neuropathy presents the ED with evaluation of multiple complaints.  Patient states that she has some generalized body aches.  Denies any recent falls or trauma.  States that she would similar symptoms few weeks ago for which she was seen in the ER.  States that these are intermittent.  She does report some nasal congestion and chills that began last night.  She reports dry cough.  She does report exposure to COVID positive patient.  She reports blood glucose has been in good control.  Does report some polyuria however denies any dysuria, chest pain, abdominal pain, nausea, vomiting, diarrhea or rash.  She has tried her previously prescribed gabapentin with no significant improvement.    The history is provided by the patient.     Review of patient's allergies indicates:   Allergen Reactions    Asa [aspirin]      Tongue lump up and nauseated     Past Medical History:   Diagnosis Date    Diabetes mellitus     Hypertension      Past Surgical History:   Procedure Laterality Date    ANKLE SURGERY Bilateral      SECTION      HYSTERECTOMY       No family history on file.  Social History     Tobacco Use    Smoking status: Never    Smokeless tobacco: Never   Substance Use Topics    Alcohol use: Yes     Comment: socially    Drug use: No     Review of Systems  See HPI  Physical Exam     Initial Vitals [23 1008]   BP Pulse Resp Temp SpO2   (!) 177/87 80 20 98 °F (36.7 °C) 97 %      MAP       --         Physical Exam    Nursing note and vitals reviewed.  Constitutional: She appears well-developed. She is Obese . She is cooperative.  Non-toxic appearance. She does not appear ill. No distress.   HENT:   Head: Normocephalic and atraumatic.    Mouth/Throat: Oropharynx is clear and moist.   Eyes: Conjunctivae and lids are normal.   Neck: Neck supple.   Cardiovascular:  Normal rate and regular rhythm.           Pulmonary/Chest: Breath sounds normal. No respiratory distress. She has no wheezes. She has no rhonchi.   Abdominal: Abdomen is soft. Bowel sounds are normal. There is no abdominal tenderness. There is no rigidity and no guarding.   Musculoskeletal:         General: Normal range of motion.      Cervical back: Neck supple. No rigidity.      Comments: No focal tenderness on exam.  Fair range of motion of all extremities with strength equal bilaterally.  Sensation grossly intact.  Reports prevalence of pain to the diffuse upper back with no rash, erythema, edema or midline spinous tenderness, step-off or obvious bony deformity     Neurological: She is alert and oriented to person, place, and time. She has normal strength. GCS eye subscore is 4. GCS verbal subscore is 5. GCS motor subscore is 6.   Skin: Skin is warm, dry and intact. No rash noted.   Psychiatric: She has a normal mood and affect. Her speech is normal and behavior is normal. Thought content normal.         ED Course   Procedures  Labs Reviewed   CBC W/ AUTO DIFFERENTIAL - Abnormal; Notable for the following components:       Result Value    Hemoglobin 11.8 (*)     MCH 26.3 (*)     MCHC 31.4 (*)     RDW 14.7 (*)     All other components within normal limits   COMPREHENSIVE METABOLIC PANEL - Abnormal; Notable for the following components:    CO2 22 (*)     Albumin 3.2 (*)     All other components within normal limits   INFLUENZA A & B BY MOLECULAR   HIV 1 / 2 ANTIBODY    Narrative:     Release to patient->Immediate   HEPATITIS C ANTIBODY    Narrative:     Release to patient->Immediate   URINALYSIS, REFLEX TO URINE CULTURE    Narrative:     Specimen Source->Urine          Imaging Results              X-Ray Chest AP Portable (Final result)  Result time 09/11/23 13:25:03      Final result by  Juhi Lyon MD (09/11/23 13:25:03)                   Impression:      No acute process seen      Electronically signed by: Juhi Lyon MD  Date:    09/11/2023  Time:    13:25               Narrative:    EXAMINATION:  XR CHEST AP PORTABLE    CLINICAL HISTORY:  Pain, unspecified    TECHNIQUE:  Single frontal view of the chest was performed.    COMPARISON:  08/09/2023    FINDINGS:  The cardiac silhouette is normal in size.  The pulmonary vascularity is normal.  The lungs are clear.  No pleural effusion, no pneumothorax.  The osseous structures appear normal.                                       Medications   ketorolac tablet 10 mg (10 mg Oral Given 9/11/23 1321)     Medical Decision Making  Amount and/or Complexity of Data Reviewed  Labs: ordered.  Radiology: ordered.    Risk  Prescription drug management.                          Medical Decision Making:   History:   Old Medical Records: I decided to obtain old medical records.  Initial Assessment:   Emergent evaluation 42-year-old presenting with recurrent myalgias and general illness symptoms.  She appears well in no significant distress.  Ambulates with smooth steady gait.  No focal bony tenderness on exam.  Remaining exam unremarkable.  Differential Diagnosis:   Differential Diagnosis includes, but is not limited to:  Sepsis, meningitis, nasal foreign body, otitis media/external, nasal polyp, bacterial sinusitis, allergic rhinitis, influenza, bacterial/viral pharyngitis,bacterial/viral pneumonia, UTI, viral syndrome, electrode abnormality, chronic pain, neuropathy  Clinical Tests:   Lab Tests: Ordered and Reviewed  Radiological Study: Ordered and Reviewed  ED Management:  Patient was seen in tele triage process were initial influenza and COVID swabs were ordered.  I did review recent ER visit with similar complaints with unremarkable workup at that time however no recent blood work.  Discuss acute evaluate some basic labs to assess for acute  emergent process although lower suspicion as patient remains well-appearing and suspect symptoms related to viral etiology.  Labs with no gross abnormalities.  Chemistry with no significant electrolyte abnormalities or signs of DELBERT.  Rapid COVID and influenza test negative.  Chest x-ray with no focal consolidation or acute intrathoracic process.  No cardiomegaly.  Urine unremarkable.  Toradol given in the ED for the myalgias.  Discuss trial of NSAIDs and symptomatic medications. Strict instructions to follow up with primary care physician or reference provided for further assessment and evaluation. Given instructions to return for any acute symptoms and verbalized understanding of this medical plan.        Clinical Impression:   Final diagnoses:  [R52] Pain  [M79.10] Myalgia (Primary)  [M54.9] Upper back pain  [B34.9] Viral syndrome        ED Disposition Condition    Discharge Stable          ED Prescriptions    None       Follow-up Information       Follow up With Specialties Details Why Contact Info    Adriel Mcbride Jr., MD Hospitalist, Family Medicine Schedule an appointment as soon as possible for a visit   [address]  [address]  [place] [facility] [address]  [phone]      [facility] - Emergency Dept Emergency Medicine  If symptoms worsen [address] [id number] [phone]             Viri Adamson PA  09/12/23 2036

## 2023-09-11 NOTE — Clinical Note
"Car Russelljadon Barrios was seen and treated in our emergency department on 9/11/2023.  She may return to work on 09/14/2023.       If you have any questions or concerns, please don't hesitate to call.      Viri Adamson PA"

## 2023-09-13 ENCOUNTER — PATIENT OUTREACH (OUTPATIENT)
Dept: EMERGENCY MEDICINE | Facility: OTHER | Age: 43
End: 2023-09-13

## 2023-10-17 ENCOUNTER — HOSPITAL ENCOUNTER (EMERGENCY)
Facility: OTHER | Age: 43
Discharge: HOME | End: 2023-10-17
Attending: EMERGENCY MEDICINE
Payer: MEDICAID

## 2023-10-17 VITALS
SYSTOLIC BLOOD PRESSURE: 150 MMHG | DIASTOLIC BLOOD PRESSURE: 94 MMHG | WEIGHT: 230 LBS | HEART RATE: 82 BPM | TEMPERATURE: 98 F | OXYGEN SATURATION: 100 % | RESPIRATION RATE: 17 BRPM | BODY MASS INDEX: 39.48 KG/M2

## 2023-10-17 DIAGNOSIS — H92.03 OTALGIA OF BOTH EARS: ICD-10-CM

## 2023-10-17 DIAGNOSIS — J34.89 SINUS PRESSURE: Primary | ICD-10-CM

## 2023-10-17 DIAGNOSIS — R09.81 SINUS CONGESTION: ICD-10-CM

## 2023-10-17 LAB
CTP QC/QA: YES
SARS-COV-2 RDRP RESP QL NAA+PROBE: NEGATIVE

## 2023-10-17 PROCEDURE — 99284 EMERGENCY DEPT VISIT MOD MDM: CPT

## 2023-10-17 PROCEDURE — 63600175 PHARM REV CODE 636 W HCPCS

## 2023-10-17 PROCEDURE — 25000003 PHARM REV CODE 250

## 2023-10-17 PROCEDURE — 25000003 PHARM REV CODE 250: Performed by: PHYSICIAN ASSISTANT

## 2023-10-17 PROCEDURE — 87635 SARS-COV-2 COVID-19 AMP PRB: CPT | Performed by: PHYSICIAN ASSISTANT

## 2023-10-17 PROCEDURE — 96372 THER/PROPH/DIAG INJ SC/IM: CPT

## 2023-10-17 RX ORDER — CETIRIZINE HYDROCHLORIDE 5 MG/1
10 TABLET ORAL
Status: COMPLETED | OUTPATIENT
Start: 2023-10-17 | End: 2023-10-17

## 2023-10-17 RX ORDER — FLUTICASONE PROPIONATE 50 MCG
2 SPRAY, SUSPENSION (ML) NASAL DAILY
Status: DISCONTINUED | OUTPATIENT
Start: 2023-10-18 | End: 2023-10-18 | Stop reason: HOSPADM

## 2023-10-17 RX ORDER — CETIRIZINE HYDROCHLORIDE 10 MG/1
10 TABLET ORAL DAILY
Qty: 7 TABLET | Refills: 0 | Status: SHIPPED | OUTPATIENT
Start: 2023-10-17 | End: 2023-10-24

## 2023-10-17 RX ORDER — FLUTICASONE PROPIONATE 50 MCG
1 SPRAY, SUSPENSION (ML) NASAL 2 TIMES DAILY PRN
Qty: 15 G | Refills: 0 | Status: SHIPPED | OUTPATIENT
Start: 2023-10-17 | End: 2023-10-22

## 2023-10-17 RX ORDER — ACETAMINOPHEN 325 MG/1
650 TABLET ORAL
Status: COMPLETED | OUTPATIENT
Start: 2023-10-17 | End: 2023-10-17

## 2023-10-17 RX ORDER — PSEUDOEPHEDRINE HYDROCHLORIDE 60 MG/1
60 TABLET ORAL EVERY 6 HOURS PRN
Qty: 28 TABLET | Refills: 0 | Status: SHIPPED | OUTPATIENT
Start: 2023-10-17 | End: 2023-10-24

## 2023-10-17 RX ORDER — KETOROLAC TROMETHAMINE 30 MG/ML
10 INJECTION, SOLUTION INTRAMUSCULAR; INTRAVENOUS
Status: COMPLETED | OUTPATIENT
Start: 2023-10-17 | End: 2023-10-17

## 2023-10-17 RX ADMIN — ACETAMINOPHEN 650 MG: 325 TABLET, FILM COATED ORAL at 08:10

## 2023-10-17 RX ADMIN — CETIRIZINE HYDROCHLORIDE 10 MG: 5 TABLET, FILM COATED ORAL at 09:10

## 2023-10-17 RX ADMIN — KETOROLAC TROMETHAMINE 10 MG: 30 INJECTION, SOLUTION INTRAMUSCULAR at 09:10

## 2023-10-18 NOTE — ED TRIAGE NOTES
Pt arrived with c/o bilateral otalgia x 2 days, L worse than R.  Pt also endorses sinus congestion, HA, ear fullness, and muffled hearing.  Pt denies any fever.  Pt took ibuprofen last night with no relief.  Pt answering questions appropriately, speaking in complete sentences, respirations even and unlabored.  Aao x 4.

## 2023-10-18 NOTE — FIRST PROVIDER EVALUATION
Emergency Department TeleTriage Encounter Note      CHIEF COMPLAINT    Chief Complaint   Patient presents with    Otalgia     C/o ear pain,headache & congestion x2 days. Pt states that she is cant hear well. Denies fevers. Denies taking anything OTC for pain. Hx of HTN & DM       VITAL SIGNS   Initial Vitals [10/17/23 1939]   BP Pulse Resp Temp SpO2   (!) 190/90 81 19 98.3 °F (36.8 °C) 100 %      MAP       --            ALLERGIES    Review of patient's allergies indicates:   Allergen Reactions    Asa [aspirin]      Tongue lump up and nauseated       PROVIDER TRIAGE NOTE  Patient presents with congestion, headache, decreased hearing to bilateral ears. No fever. Patient reports compliance with BP meds today- this morning.       ORDERS  Labs Reviewed - No data to display    ED Orders (720h ago, onward)      None              Virtual Visit Note: The provider triage portion of this emergency department evaluation and documentation was performed via [employer], a HIPAA-compliant telemedicine application, in concert with a tele-presenter in the room. A face to face patient evaluation with one of my colleagues will occur once the patient is placed in an emergency department room.      DISCLAIMER: This note was prepared with [employer] voice recognition transcription software. Garbled syntax, mangled pronouns, and other bizarre constructions may be attributed to that software system.

## 2023-10-18 NOTE — ED PROVIDER NOTES
Encounter Date: 10/17/2023       History     Chief Complaint   Patient presents with    Otalgia     C/o ear pain,headache & congestion x2 days. Pt states that she is cant hear well. Denies fevers. Denies taking anything OTC for pain. Hx of HTN & DM     Car Barrios is a 42 y.o. female with history of HTN and DM presenting to the emergency department for evaluation of bilateral ear pain, left worse than right for 2 days.  Also reporting headache, ear fullness, sinus congestion and pressure, and muffled hearing.  Reports taking ibuprofen last night, which did not provide relief of her symptoms.  She denies fever, chills, sore throat, cough, shortness of breath, chest pain, abdominal pain, nausea, vomiting, diarrhea, urinary symptoms, vaginal bleeding, or vaginal discharge.      The history is provided by the patient.     Review of patient's allergies indicates:   Allergen Reactions    Asa [aspirin]      Tongue lump up and nauseated     Past Medical History:   Diagnosis Date    Diabetes mellitus     Hypertension      Past Surgical History:   Procedure Laterality Date    ANKLE SURGERY Bilateral      SECTION      HYSTERECTOMY       No family history on file.  Social History     Tobacco Use    Smoking status: Never    Smokeless tobacco: Never   Substance Use Topics    Alcohol use: Yes     Comment: socially    Drug use: No     Review of Systems   Constitutional:  Negative for chills and fever.   HENT:  Positive for congestion, ear pain (bilateral), hearing loss (muffled), sinus pressure and sinus pain. Negative for rhinorrhea and sore throat.    Respiratory:  Negative for cough and shortness of breath.    Cardiovascular:  Negative for chest pain.   Gastrointestinal:  Negative for abdominal pain, diarrhea, nausea and vomiting.   Genitourinary:  Negative for dysuria, frequency and urgency.   Musculoskeletal:  Negative for back pain.   Skin:  Negative for rash.   Neurological:  Positive for headaches. Negative for  dizziness.   Psychiatric/Behavioral:  Negative for confusion.        Physical Exam     Initial Vitals [10/17/23 1939]   BP Pulse Resp Temp SpO2   (!) 190/90 81 19 98.3 °F (36.8 °C) 100 %      MAP       --         Physical Exam    Nursing note and vitals reviewed.  Constitutional: She appears well-developed and well-nourished. No distress.   HENT:   Head: Normocephalic and atraumatic.   Right Ear: Tympanic membrane, external ear and ear canal normal. No drainage or swelling.   Left Ear: Tympanic membrane, external ear and ear canal normal. No drainage or swelling.   Nose: Nose normal.   Mouth/Throat: Oropharynx is clear and moist.   Eyes: Conjunctivae and EOM are normal.   Neck: Neck supple.   Normal range of motion.  Cardiovascular:  Normal rate, regular rhythm, normal heart sounds and intact distal pulses.           Pulmonary/Chest: Breath sounds normal. No respiratory distress. She has no wheezes. She has no rhonchi. She has no rales. She exhibits no tenderness.   Musculoskeletal:         General: Normal range of motion.      Cervical back: Normal range of motion and neck supple.     Neurological: She is alert and oriented to person, place, and time. She has normal strength.   Skin: Skin is warm and dry.   Psychiatric: She has a normal mood and affect. Her behavior is normal. Judgment and thought content normal.         ED Course   Procedures  Labs Reviewed   SARS-COV-2 RDRP GENE          Imaging Results    None          Medications   acetaminophen tablet 650 mg (650 mg Oral Given 10/17/23 2047)   ketorolac injection 9.999 mg (9.999 mg Intramuscular Given 10/17/23 2140)   cetirizine tablet 10 mg (10 mg Oral Given 10/17/23 2140)     Medical Decision Making  Risk  OTC drugs.  Prescription drug management.                          Medical Decision Making:   Initial Assessment:   Urgent evaluation of 42-year-old female who presents with headache, bilateral ear pain, sinus pressure and congestion, ear fullness, and  muffled hearing.  No recent trauma to the ear.  She denies fever or chills.  On exam, she appears uncomfortable but nontoxic.  Blood pressure is elevated in the ED but otherwise hemodynamically stable.  She is afebrile today.  On exam, TMs appear intact.  No erythema or drainage.  She does have tenderness to palpation of the frontal and maxillary sinuses.  Pending rapid COVID test ordered by the triage provider.  Symptoms likely due to sinusitis versus allergies.  Will give Tylenol, Toradol, and Zyrtec.  Clinical Tests:   Lab Tests: Ordered and Reviewed  ED Management:  Rapid COVID test is negative today.  I updated the patient on all results.  Explained that she is likely experiencing sinusitis versus seasonal allergies.  Discharge home with prescriptions for Sudafed, Zyrtec, and Flonase.  Advised her to take Tylenol and ibuprofen as needed for fever, headache, pain.  Recommended increasing her fluid intake.  Patient verbalized understanding and agreement with this plan of care. She was given specific return precautions. Advised to follow up with PCP as needed. All questions and concerns addressed. She is stable for discharge.     This note was created with MModal Fluency Direct Dictation. Please excuse any spelling or grammatical errors.      Clinical Impression:   Final diagnoses:  [J34.89] Sinus pressure (Primary)  [R09.81] Sinus congestion  [H92.03] Otalgia of both ears        ED Disposition Condition    Discharge Stable          ED Prescriptions       Medication Sig Dispense Start Date End Date Auth. Provider    pseudoephedrine (SUDAFED) 60 MG tablet Take 1 tablet (60 mg total) by mouth every 6 (six) hours as needed for Congestion. 28 tablet 10/17/2023 10/24/2023 Gary Chavez PA-C    cetirizine (ZYRTEC) 10 MG tablet Take 1 tablet (10 mg total) by mouth once daily. for 7 days 7 tablet 10/17/2023 10/24/2023 Gary Chavez PA-C    fluticasone propionate (FLONASE) 50 mcg/actuation nasal spray 1 spray (50 mcg  total) by Each Nostril route 2 (two) times daily as needed for Rhinitis or Allergies. 15 g 10/17/2023 10/22/2023 Gary Chavez PA-C          Follow-up Information    None          Gary Chavez PA-C  10/19/23 1504

## 2023-12-29 ENCOUNTER — HOSPITAL ENCOUNTER (EMERGENCY)
Facility: OTHER | Age: 43
Discharge: HOME | End: 2023-12-29
Attending: EMERGENCY MEDICINE
Payer: MEDICAID

## 2023-12-29 VITALS
BODY MASS INDEX: 39.27 KG/M2 | OXYGEN SATURATION: 99 % | DIASTOLIC BLOOD PRESSURE: 95 MMHG | HEIGHT: 64 IN | RESPIRATION RATE: 18 BRPM | TEMPERATURE: 98 F | WEIGHT: 230 LBS | SYSTOLIC BLOOD PRESSURE: 164 MMHG | HEART RATE: 68 BPM

## 2023-12-29 DIAGNOSIS — M25.532 WRIST PAIN, ACUTE, LEFT: Primary | ICD-10-CM

## 2023-12-29 DIAGNOSIS — R52 PAIN: ICD-10-CM

## 2023-12-29 LAB
B-HCG UR QL: NEGATIVE
CTP QC/QA: YES

## 2023-12-29 PROCEDURE — 99284 EMERGENCY DEPT VISIT MOD MDM: CPT

## 2023-12-29 PROCEDURE — 96372 THER/PROPH/DIAG INJ SC/IM: CPT | Mod: 59

## 2023-12-29 PROCEDURE — 63600175 PHARM REV CODE 636 W HCPCS

## 2023-12-29 PROCEDURE — 29125 APPL SHORT ARM SPLINT STATIC: CPT | Mod: LT

## 2023-12-29 PROCEDURE — 81025 URINE PREGNANCY TEST: CPT

## 2023-12-29 RX ORDER — IBUPROFEN 600 MG/1
600 TABLET ORAL EVERY 6 HOURS PRN
Qty: 20 TABLET | Refills: 0 | Status: SHIPPED | OUTPATIENT
Start: 2023-12-29

## 2023-12-29 RX ORDER — KETOROLAC TROMETHAMINE 30 MG/ML
15 INJECTION, SOLUTION INTRAMUSCULAR; INTRAVENOUS
Status: COMPLETED | OUTPATIENT
Start: 2023-12-29 | End: 2023-12-29

## 2023-12-29 RX ADMIN — KETOROLAC TROMETHAMINE 15 MG: 30 INJECTION, SOLUTION INTRAMUSCULAR at 02:12

## 2023-12-29 NOTE — Clinical Note
"Car Russelljadon Barrios was seen and treated in our emergency department on 12/29/2023.  She may return to work on 12/30/2023.       If you have any questions or concerns, please don't hesitate to call.      Ally Gómez PA-C"

## 2023-12-29 NOTE — ED NOTES
Patient identifiers verified and correct for Ms. Gonzalez  C/C: left wrist pain without swelling  APPEARANCE: awake and alert in NAD.  SKIN: warm, dry and intact. No breakdown or bruising.  MUSCULOSKELETAL: Patient moving all extremities spontaneously, no obvious swelling or deformities noted. Ambulates independently.  RESPIRATORY: Denies shortness of breath.Respirations unlabored.   CARDIAC: Denies CP,  distal pulses; no peripheral edema  ABDOMEN: denies abdominal pain and n/v/d   : voids spontaneously, denies difficulty  Neurologic: AAO x 4; follows commands equal strength in all extremities; denies numbness/tingling. Denies dizziness

## 2023-12-29 NOTE — ED NOTES
Car Barrios, a 43 y.o. female with hx of carpal tunnel presents to the ED w/ complaint of left wrist pain x1 day. Tylenol taken without relief.  Patient denies recent injuries.     Triage note:  Chief Complaint   Patient presents with    Wrist Pain     Pt reporting L wrist pain x 1 day. Hx of carpal tunnel. Pt endorses taking tylenol with no relief in symptoms.      Review of patient's allergies indicates:   Allergen Reactions    Asa [aspirin]      Tongue lump up and nauseated     Past Medical History:   Diagnosis Date    Diabetes mellitus     Hypertension

## 2023-12-29 NOTE — DISCHARGE INSTRUCTIONS
You were seen in the ER for evaluation of wrist pain.  I believe that your wrist pain is likely secondary to carpal tunnel syndrome.  Please follow up with your primary care provider for further evaluation and injections.  I am prescribing you ibuprofen that you may take as needed for pain.  You may also alternate this with Tylenol over-the-counter.  I am also providing with a brace that you should wear to aid in pain.  You may apply ice to aid in pain as well for 20 minutes at a time.  Return to the ER for any new or worsening symptoms.

## 2023-12-29 NOTE — FIRST PROVIDER EVALUATION
Emergency Department TeleTriage Encounter Note      CHIEF COMPLAINT    Chief Complaint   Patient presents with    Wrist Pain     Pt reporting L wrist pain x 1 day. Hx of carpal tunnel. Pt endorses taking tylenol with no relief in symptoms.        VITAL SIGNS   Initial Vitals [12/29/23 1235]   BP Pulse Resp Temp SpO2   (!) 176/101 79 18 97.8 °F (36.6 °C) 98 %      MAP       --            ALLERGIES    Review of patient's allergies indicates:   Allergen Reactions    Asa [aspirin]      Tongue lump up and nauseated       PROVIDER TRIAGE NOTE  Verbal consent for the teletriage evaluation was given by the patient at the start of the evaluation.  All efforts will be made to maintain patient's privacy during the evaluation.      This is a teletriage evaluation of a 43 y.o. female presenting to the ED with c/o left wrist pain for 2 days.  Denies trauma or injury. Limited physical exam via telehealth: The patient is awake, alert, answering questions appropriately and is not in respiratory distress.  As the Teletriage provider, I performed an initial assessment and ordered appropriate labs and imaging studies, if any, to facilitate the patient's care once placed in the ED. Once a room is available, care and a full evaluation will be completed by an alternate ED provider.  Any additional orders and the final disposition will be determined by that provider.  All imaging and labs will not be followed-up by the Teletriage Team, including myself.          ORDERS  Labs Reviewed - No data to display    ED Orders (720h ago, onward)      Start Ordered     Status Ordering Provider    12/29/23 1258 12/29/23 1257  X-Ray Wrist Complete Left  1 time imaging         Ordered JERONIMO COHN    12/29/23 1258 12/29/23 1257  X-Ray Hand 3 view Left  1 time imaging         Ordered JERONIMO COHN              Virtual Visit Note: The provider triage portion of this emergency department evaluation and documentation was performed via [email or web address], a  HIPAA-compliant telemedicine application, in concert with a tele-presenter in the room. A face to face patient evaluation with one of my colleagues will occur once the patient is placed in an emergency department room.      DISCLAIMER: This note was prepared with WritePath voice recognition transcription software. Garbled syntax, mangled pronouns, and other bizarre constructions may be attributed to that software system.

## 2023-12-31 NOTE — ED PROVIDER NOTES
"  Source of History:  Patient    Chief complaint:  Wrist Pain (Pt reporting L wrist pain x 1 day. Hx of carpal tunnel. Pt endorses taking tylenol with no relief in symptoms. )      HPI:  Car Barrios is a 43 y.o. female presenting with  left wrist pain x 1 day.  Patient reports gradual onset of left wrist pain yesterday while at work.  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  She describes the pain as a sharp, shocking pain to her left wrist.  Denies any radiating pain.  Denies any numbness or tingling.  Reports the pain is worse with movement of her wrist.  Reports that she has a history of carpal tunnel and that this pain feels similar, although normally her pain is not this severe.  She denies any trauma or injury to the wrist.  Denies any fever, chills, nausea, vomiting.  Has taken Tylenol with no relief in symptoms.    This is the extent to the patients complaints today here in the emergency department.    ROS: As per HPI and below:  Constitutional: No fever.  No chills.  ENT: No sore throat. No ear pain.  Abdomen:  No nausea, vomiting  Urinary: No abnormal urination.  MSK:  Positive for left wrist pain.  Integument: No rashes or lesions.    Review of patient's allergies indicates:   Allergen Reactions    Asa [aspirin]      Tongue lump up and nauseated       PMH:  As per HPI and below:  Past Medical History:   Diagnosis Date    Diabetes mellitus     Hypertension      Past Surgical History:   Procedure Laterality Date    ANKLE SURGERY Bilateral      SECTION      HYSTERECTOMY         Social History     Tobacco Use    Smoking status: Never    Smokeless tobacco: Never   Substance Use Topics    Alcohol use: Yes     Comment: socially    Drug use: No       Physical Exam:    BP (!) 164/95 (BP Location: Left arm, Patient Position: Sitting)   Pulse 68   Temp 97.8 °F (36.6 °C) (Oral)   Resp 18   Ht 5' 4" (1.626 m)   Wt 104.3 kg (230 lb)   SpO2 99%   Breastfeeding No   BMI 39.48 " kg/m²   Nursing note and vital signs reviewed.  Constitutional: No acute distress.  Eyes: No conjunctival injection.  Extraocular muscles are intact.  ENT: Normal phonation.  Chest: No respiratory distress.  Cardiovascular:  Regular rate  Musculoskeletal:  Left wrist with tenderness to palpation over the volar wrist, pain elicited with percussion of the anterior volar wrist, worse on the ulnar side.  No tenderness to palpation of the dorsal wrist, left hand, left forearm/elbow. There is no significant swelling on my exam.  Pain is elicited with passive wrist flexion and extension.  No pain with wrist radial and ulnar deviation.  2+ radial pulse palpable.  Distal hand sensory and motor intact.  Full active range of motion of left hand and elbow intact.  Skin: No rashes seen.  Good turgor.  No abrasions.  No ecchymoses.  Psych: Appropriate, conversant.        I decided to obtain the patient's medical records.    Results for orders placed or performed during the hospital encounter of 12/29/23   POCT urine pregnancy   Result Value Ref Range    POC Preg Test, Ur Negative Negative     Acceptable Yes      Imaging Results              X-Ray Wrist Complete Left (Final result)  Result time 12/29/23 13:15:20      Final result by Kem Collado MD (12/29/23 13:15:20)                   Impression:      See above      Electronically signed by: Kem Collado MD  Date:    12/29/2023  Time:    13:15               Narrative:    EXAMINATION:  XR WRIST COMPLETE 3 VIEWS LEFT    CLINICAL HISTORY:  Pain, unspecified    TECHNIQUE:  PA, lateral, and oblique views of the left wrist were performed.    COMPARISON:  None    FINDINGS:  No fracture or dislocation.  No bone destruction identified .                                       X-Ray Hand 3 view Left (Final result)  Result time 12/29/23 13:16:13      Final result by Jd Glez MD (12/29/23 13:16:13)                   Impression:      Dorsal wrist and distal forearm  suspected nonspecific soft tissue swelling from edema or cellulitis, without acute displaced fracture-dislocation identified.      Electronically signed by: Jd Glez MD  Date:    12/29/2023  Time:    13:16               Narrative:    EXAMINATION:  XR HAND COMPLETE 3 VIEW LEFT    CLINICAL HISTORY:  pain;.    TECHNIQUE:  PA, lateral, and oblique views of the left hand were performed.    COMPARISON:  None    FINDINGS:  Bones are well mineralized. Overall alignment is within normal limits. No displaced fracture, dislocation or destructive osseous process.  Joint spaces appear relatively maintained.    Prominence of the dorsal soft tissues overlying the distal forearm and wrist suggesting nonspecific swelling from edema or cellulitis.  No subcutaneous emphysema or radiodense retained foreign body.                                      MDM:    43 y.o. female with past medical history of carpal tunnel syndrome, hypertension, diabetes presenting for evaluation of left wrist pain x1 day.  Pain vaginal worsened over the past day with no acute injury.  Pain is similar in quality to past pain of carpal tunnel syndrome.  Pain elicited on exam with percussion over the volar wrist.  Distal extremities neurovascularly intact.    Initial differentials include carpal tunnel syndrome, nerve injury, wrist sprain/strain fracture, dislocation, contusion, septic joint, cellulitis    No signs of infectious etiology on exam.  X-ray obtained that reveals no acute osseous abnormalities.  Exam findings consistent with likely recurrence of carpal tunnel syndrome.  Patient states that she has received injections to her by her primary care provider before that pain.  She was advised to follow up with her primary care provider for further evaluation and to obtain injections if indicated.  In the meantime, we will discharge with anti-inflammatories, volar wrist, conservative treatment. Patient was educated on discharge instructions and was in  agreement with plan. I discussed this case with my attending provider, Dr. Hernandez, who was in agreement with plan.     ED Course as of 12/30/23 2320   Fri Dec 29, 2023   1537 Pt is a 43 y.o. F with history of carpal tunnel syndrome who works for the accounting dept at [employer] who presents with atraumatic Left hand and wrist pain. No focal deficits.   On exam, pt has volar wrist tenderness, pain worse with tapping over volar wrist, neurovascularly intact distally.   History with repetitive use injury.   Hand and wrist films were ordered at triage  I independently interpreted and reviewed the patient's films, notable for no acute fracture, dislocation, or radiopaque foreign body.    Plan is maximal supportive care with splint, PCP follow-up. [RC]      ED Course User Index  [RC] Sebastián Hernandez MD               Diagnostic Impression:    1. Wrist pain, acute, left    2. Pain         ED Disposition Condition    Discharge Stable            ED Prescriptions       Medication Sig Dispense Start Date End Date Auth. Provider    ibuprofen (ADVIL,MOTRIN) 600 MG tablet Take 1 tablet (600 mg total) by mouth every 6 (six) hours as needed for Pain. 20 tablet 12/29/2023 -- Ally Gómez PA-C          Follow-up Information       Follow up With Specialties Details Why Contact Info    Adriel Mcbride Jr., MD Hospitalist, Family Medicine Schedule an appointment as soon as possible for a visit in 2 days  [address] [facility]  [address]  [place] [facility] [address]  [phone]      [facility] - Emergency Dept Emergency Medicine Go to  If symptoms worsen [address]  [facility] [id number] [phone]            Please pardon typos or dictation errors, as this note was transcribed using M*Modal fluency direct dictation software.      Ally Gómez PA-C  12/31/23 0022

## 2024-02-20 ENCOUNTER — HOSPITAL ENCOUNTER (EMERGENCY)
Facility: OTHER | Age: 44
Discharge: HOME | End: 2024-02-20
Attending: EMERGENCY MEDICINE

## 2024-02-20 VITALS
HEIGHT: 64 IN | TEMPERATURE: 99 F | BODY MASS INDEX: 38.07 KG/M2 | SYSTOLIC BLOOD PRESSURE: 154 MMHG | RESPIRATION RATE: 20 BRPM | DIASTOLIC BLOOD PRESSURE: 97 MMHG | WEIGHT: 223 LBS | OXYGEN SATURATION: 99 % | HEART RATE: 85 BPM

## 2024-02-20 DIAGNOSIS — J06.9 VIRAL URI WITH COUGH: Primary | ICD-10-CM

## 2024-02-20 DIAGNOSIS — E11.65 HYPERGLYCEMIA DUE TO DIABETES MELLITUS: ICD-10-CM

## 2024-02-20 LAB
ALBUMIN SERPL BCP-MCNC: 3.5 G/DL (ref 3.5–5.2)
ALP SERPL-CCNC: 86 U/L (ref 55–135)
ALT SERPL W/O P-5'-P-CCNC: 19 U/L (ref 10–44)
ANION GAP SERPL CALC-SCNC: 9 MMOL/L (ref 8–16)
AST SERPL-CCNC: 21 U/L (ref 10–40)
B-OH-BUTYR BLD STRIP-SCNC: 0.1 MMOL/L (ref 0–0.5)
BACTERIA #/AREA URNS HPF: NORMAL /HPF
BASOPHILS # BLD AUTO: 0.05 K/UL (ref 0–0.2)
BASOPHILS NFR BLD: 0.8 % (ref 0–1.9)
BILIRUB SERPL-MCNC: 0.3 MG/DL (ref 0.1–1)
BILIRUB UR QL STRIP: NEGATIVE
BUN SERPL-MCNC: 7 MG/DL (ref 6–20)
CALCIUM SERPL-MCNC: 9.5 MG/DL (ref 8.7–10.5)
CHLORIDE SERPL-SCNC: 100 MMOL/L (ref 95–110)
CLARITY UR: CLEAR
CO2 SERPL-SCNC: 26 MMOL/L (ref 23–29)
COLOR UR: YELLOW
CREAT SERPL-MCNC: 0.8 MG/DL (ref 0.5–1.4)
CTP QC/QA: YES
CTP QC/QA: YES
DIFFERENTIAL METHOD BLD: ABNORMAL
EOSINOPHIL # BLD AUTO: 0.2 K/UL (ref 0–0.5)
EOSINOPHIL NFR BLD: 3.2 % (ref 0–8)
ERYTHROCYTE [DISTWIDTH] IN BLOOD BY AUTOMATED COUNT: 14.2 % (ref 11.5–14.5)
EST. GFR  (NO RACE VARIABLE): >60 ML/MIN/1.73 M^2
GLUCOSE SERPL-MCNC: 274 MG/DL (ref 70–110)
GLUCOSE UR QL STRIP: ABNORMAL
GROUP A STREP, MOLECULAR: NEGATIVE
HCT VFR BLD AUTO: 36.6 % (ref 37–48.5)
HGB BLD-MCNC: 11.9 G/DL (ref 12–16)
HGB UR QL STRIP: NEGATIVE
IMM GRANULOCYTES # BLD AUTO: 0.01 K/UL (ref 0–0.04)
IMM GRANULOCYTES NFR BLD AUTO: 0.2 % (ref 0–0.5)
KETONES UR QL STRIP: NEGATIVE
LEUKOCYTE ESTERASE UR QL STRIP: NEGATIVE
LYMPHOCYTES # BLD AUTO: 2.6 K/UL (ref 1–4.8)
LYMPHOCYTES NFR BLD: 43.4 % (ref 18–48)
MCH RBC QN AUTO: 26.7 PG (ref 27–31)
MCHC RBC AUTO-ENTMCNC: 32.5 G/DL (ref 32–36)
MCV RBC AUTO: 82 FL (ref 82–98)
MICROSCOPIC COMMENT: NORMAL
MONOCYTES # BLD AUTO: 0.4 K/UL (ref 0.3–1)
MONOCYTES NFR BLD: 7.2 % (ref 4–15)
NEUTROPHILS # BLD AUTO: 2.7 K/UL (ref 1.8–7.7)
NEUTROPHILS NFR BLD: 45.2 % (ref 38–73)
NITRITE UR QL STRIP: NEGATIVE
NRBC BLD-RTO: 0 /100 WBC
PH UR STRIP: 7 [PH] (ref 5–8)
PLATELET # BLD AUTO: 323 K/UL (ref 150–450)
PMV BLD AUTO: 10.6 FL (ref 9.2–12.9)
POC MOLECULAR INFLUENZA A AGN: NEGATIVE
POC MOLECULAR INFLUENZA B AGN: NEGATIVE
POCT GLUCOSE: 219 MG/DL (ref 70–110)
POCT GLUCOSE: 287 MG/DL (ref 70–110)
POTASSIUM SERPL-SCNC: 4.4 MMOL/L (ref 3.5–5.1)
PROT SERPL-MCNC: 7.4 G/DL (ref 6–8.4)
PROT UR QL STRIP: NEGATIVE
RBC # BLD AUTO: 4.46 M/UL (ref 4–5.4)
RBC #/AREA URNS HPF: 0 /HPF (ref 0–4)
SARS-COV-2 RDRP RESP QL NAA+PROBE: NEGATIVE
SODIUM SERPL-SCNC: 135 MMOL/L (ref 136–145)
SP GR UR STRIP: 1.03 (ref 1–1.03)
SQUAMOUS #/AREA URNS HPF: 2 /HPF
URN SPEC COLLECT METH UR: ABNORMAL
UROBILINOGEN UR STRIP-ACNC: NEGATIVE EU/DL
WBC # BLD AUTO: 5.95 K/UL (ref 3.9–12.7)
WBC #/AREA URNS HPF: 1 /HPF (ref 0–5)
YEAST URNS QL MICRO: NORMAL

## 2024-02-20 PROCEDURE — 82010 KETONE BODYS QUAN: CPT

## 2024-02-20 PROCEDURE — 87651 STREP A DNA AMP PROBE: CPT

## 2024-02-20 PROCEDURE — 81000 URINALYSIS NONAUTO W/SCOPE: CPT

## 2024-02-20 PROCEDURE — 99284 EMERGENCY DEPT VISIT MOD MDM: CPT | Mod: 25

## 2024-02-20 PROCEDURE — 96360 HYDRATION IV INFUSION INIT: CPT

## 2024-02-20 PROCEDURE — 25000003 PHARM REV CODE 250

## 2024-02-20 PROCEDURE — 82962 GLUCOSE BLOOD TEST: CPT

## 2024-02-20 PROCEDURE — 80053 COMPREHEN METABOLIC PANEL: CPT

## 2024-02-20 PROCEDURE — 87635 SARS-COV-2 COVID-19 AMP PRB: CPT

## 2024-02-20 PROCEDURE — 85025 COMPLETE CBC W/AUTO DIFF WBC: CPT

## 2024-02-20 RX ORDER — FLUTICASONE PROPIONATE 50 MCG
1 SPRAY, SUSPENSION (ML) NASAL 2 TIMES DAILY PRN
Qty: 15 G | Refills: 0 | Status: SHIPPED | OUTPATIENT
Start: 2024-02-20

## 2024-02-20 RX ORDER — NIRMATRELVIR AND RITONAVIR 300-100 MG
KIT ORAL
Qty: 30 TABLET | Refills: 0 | Status: SHIPPED | OUTPATIENT
Start: 2024-02-20 | End: 2024-02-25

## 2024-02-20 RX ORDER — BENZONATATE 200 MG/1
200 CAPSULE ORAL 3 TIMES DAILY PRN
Qty: 20 CAPSULE | Refills: 0 | Status: SHIPPED | OUTPATIENT
Start: 2024-02-20 | End: 2024-03-01

## 2024-02-20 RX ADMIN — SODIUM CHLORIDE 1000 ML: 9 INJECTION, SOLUTION INTRAVENOUS at 06:02

## 2024-02-20 NOTE — Clinical Note
"Car Russelljadon Barrios was seen and treated in our emergency department on 2/20/2024.  She may return to work on 02/26/2024.       If you have any questions or concerns, please don't hesitate to call.      Ally Gómez PA-C"

## 2024-02-21 NOTE — DISCHARGE INSTRUCTIONS
You were seen in the ER and diagnosed with an upper respiratory infection.  I suspect that you likely have COVID given multiple other family members have tested positive for this.  I am prescribing you Paxlovid that you should take twice a day for 5 days to prevent severe COVID.  While taking this medication, do not take your cholesterol medication, atorvastatin, as it can interact with the paxlovid. You may resume taking your atorvastatin as prescribed when you finish the paxlovid.  I am prescribing Tessalon that you may take up to 3 times a day as needed for cough.  I am prescribing you Flonase for rhinitis that you may use as needed.  You may take Tylenol and ibuprofen over-the-counter for body aches and headaches. You may also take over the counter zyrtec once a day for congestion. You may drink tea with honey in it and gargle with warm salt water to aid in throat pain.  Please check your sugar frequently and maintain compliance with your insulin dosing to control your sugars while sick.  Please follow up with your primary care provider. You may return to the ER for any new or worsening symptoms.

## 2024-02-21 NOTE — ED PROVIDER NOTES
Encounter Date: 2024       History     Chief Complaint   Patient presents with    COVID-19 Concerns     Pt requesting Covid test after her sister had positive result.     43-year-old female with past medical history hypertension and diabetes presenting for evaluation of COVID concerns.  Patient reports that she began having a sore throat with associated cough and congestion yesterday.  She reports that her sister tested positive for COVID today.  She denies any associated fever, chills, abdominal pain, nausea, vomiting, chest pain, shortness of breath.  She does also report that she has been having urinary frequency for the past day.  She admits to noncompliance with her insulin for diabetes today.    The history is provided by the patient.     Review of patient's allergies indicates:   Allergen Reactions    Asa [aspirin]      Tongue lump up and nauseated     Past Medical History:   Diagnosis Date    Diabetes mellitus     Hypertension      Past Surgical History:   Procedure Laterality Date    ANKLE SURGERY Bilateral      SECTION      HYSTERECTOMY       No family history on file.  Social History     Tobacco Use    Smoking status: Never    Smokeless tobacco: Never   Substance Use Topics    Alcohol use: Yes     Comment: socially    Drug use: No     Review of Systems   Constitutional:  Negative for chills and fever.   HENT:  Positive for congestion, rhinorrhea, sneezing and sore throat.    Respiratory:  Negative for shortness of breath and wheezing.    Cardiovascular:  Negative for chest pain.   Gastrointestinal:  Negative for abdominal pain, diarrhea, nausea and vomiting.   Genitourinary:  Positive for frequency. Negative for dysuria and hematuria.   Musculoskeletal:  Negative for arthralgias.   Neurological:  Negative for headaches.       Physical Exam     Initial Vitals [24 1651]   BP Pulse Resp Temp SpO2   (!) 143/90 90 18 98 °F (36.7 °C) 99 %      MAP       --         Physical Exam    Nursing  note and vitals reviewed.  Constitutional: She appears well-developed and well-nourished.   HENT:   Head: Normocephalic and atraumatic.   Throat with mild swelling of bilateral tonsils, no significant erythema, no tonsillar exudates.  Uvula midline.  Patient tolerating secretions.  No elevation of posterior pharynx.  Congestion present on exam.  No maxillary or frontal sinus tenderness.   Eyes: Conjunctivae are normal.   Neck:   Normal range of motion.  Cardiovascular:  Normal rate and regular rhythm.           No murmur heard.  Pulmonary/Chest: Breath sounds normal. She has no wheezes. She has no rales.   Abdominal: Abdomen is soft. She exhibits no distension. There is no abdominal tenderness. There is no rebound.   Musculoskeletal:         General: Normal range of motion.      Cervical back: Normal range of motion.     Neurological: She is alert and oriented to person, place, and time. GCS score is 15. GCS eye subscore is 4. GCS verbal subscore is 5. GCS motor subscore is 6.   Skin: Skin is warm and dry.   Psychiatric: She has a normal mood and affect.         ED Course   Procedures  Labs Reviewed   URINALYSIS, REFLEX TO URINE CULTURE - Abnormal; Notable for the following components:       Result Value    Glucose, UA 4+ (*)     All other components within normal limits    Narrative:     Specimen Source->Urine   CBC W/ AUTO DIFFERENTIAL - Abnormal; Notable for the following components:    Hemoglobin 11.9 (*)     Hematocrit 36.6 (*)     MCH 26.7 (*)     All other components within normal limits   POCT GLUCOSE - Abnormal; Notable for the following components:    POCT Glucose 287 (*)     All other components within normal limits   GROUP A STREP, MOLECULAR   URINALYSIS MICROSCOPIC    Narrative:     Specimen Source->Urine   COMPREHENSIVE METABOLIC PANEL   BETA - HYDROXYBUTYRATE, SERUM   SARS-COV-2 RDRP GENE   POCT INFLUENZA A/B MOLECULAR   POCT GLUCOSE MONITORING CONTINUOUS          Imaging Results    None           Medications   sodium chloride 0.9% bolus 1,000 mL 1,000 mL (1,000 mLs Intravenous New Bag 2/20/24 1835)     Medical Decision Making  Urgent evaluation of 43-year-old female past medical history hypertension diabetes presenting for evaluation of COVID concerns.  Also reporting urinary frequency, reports a history of diabetes, admits noncompliance with insulin today.  Patient is afebrile and hemodynamically stable on exam.  Exam findings as described above.  Point of care glucose obtained, 287, we will obtain labs, UA, COVID, flu, strep testing and reassess.  Patient with likely viral URI causing upper respiratory symptoms, lungs clear to auscultation bilaterally, do not suspect pneumonia.    Differential Diagnosis includes, but is not limited to:  Sepsis, meningitis, cavernous sinus thrombosis, nasal foreign body, otitis media/external, nasal polyp, bacterial sinusitis, allergic rhinitis, influenza, bacterial/viral pharyngitis, peritonsillar abscess, retropharyngeal abscess, bacterial/viral pneumonia, hyperglycemia, DKA    Amount and/or Complexity of Data Reviewed  Labs: ordered. Decision-making details documented in ED Course.               ED Course as of 02/20/24 1931 Tue Feb 20, 2024   1741 SARS-CoV-2 RNA, Amplification, Qual: Negative [SW]   1821 POC Molecular Influenza A Ag: Negative [SW]   1821 POC Molecular Influenza B Ag: Negative [SW]   1826 Group A Strep, Molecular: Negative [SW]   1910 Urinalysis, Reflex to Urine Culture Urine, Clean Catch(!)  Notable for 4+ glucose, no evidence of UTI, no ketones. [SW]   1914 CBC auto differential(!)  Very mild anemia, otherwise unremarkable, no leukocytosis. [SW]   1927 CMP unremarkable, normal anion gap, normal renal function, no significant electrolyte abnormalities. [SW]   1928 Beta-Hydroxybutyrate: 0.1 [SW]   1928 Workup reassuring, no evidence of DKA.  Suspect urinary frequency secondary to hyperglycemia, patient educated on insulin compliance and frequent  blood sugar checks.  Patient with likely viral URI, suspect COVID infection given multiple family members positive for COVID.  No evidence of pneumonia, meningitis, other acute etiology on exam.  Symptoms onset less than 24 hours ago, suspect false negative COVID test today given early testing.  Discussed this with patient, given that she has history of hypertension diabetes in his high risk, we will prescribe Paxlovid for suspected COVID infection. Patient advised to hold atorvastatin for duration of paxlovid treatment.   Educated patient on symptomatic URI treatment.  Strict ER return precautions given.  Patient educated on insulin adherence, frequent blood sugar checks, she verbalized understanding.  No indication further emergent imaging this time.  Patient is stable for discharge at this time. [SW]      ED Course User Index  [SW] Ally Gómez PA-C                           Clinical Impression:  Final diagnoses:  [J06.9] Viral URI with cough (Primary)  [E11.65] Hyperglycemia due to diabetes mellitus                 Ally Gómez PA-C  02/20/24 1933

## 2024-05-06 ENCOUNTER — HOSPITAL ENCOUNTER (EMERGENCY)
Facility: OTHER | Age: 44
Discharge: HOME | End: 2024-05-07
Attending: EMERGENCY MEDICINE
Payer: MEDICAID

## 2024-05-06 DIAGNOSIS — R07.9 CHEST PAIN OF UNKNOWN ETIOLOGY: Primary | ICD-10-CM

## 2024-05-06 DIAGNOSIS — R07.9 CHEST PAIN: ICD-10-CM

## 2024-05-06 LAB
BASOPHILS # BLD AUTO: 0.07 K/UL (ref 0–0.2)
BASOPHILS NFR BLD: 0.9 % (ref 0–1.9)
DIFFERENTIAL METHOD BLD: ABNORMAL
EOSINOPHIL # BLD AUTO: 0.3 K/UL (ref 0–0.5)
EOSINOPHIL NFR BLD: 3.4 % (ref 0–8)
ERYTHROCYTE [DISTWIDTH] IN BLOOD BY AUTOMATED COUNT: 14.3 % (ref 11.5–14.5)
HCT VFR BLD AUTO: 38 % (ref 37–48.5)
HGB BLD-MCNC: 12.1 G/DL (ref 12–16)
IMM GRANULOCYTES # BLD AUTO: 0.02 K/UL (ref 0–0.04)
IMM GRANULOCYTES NFR BLD AUTO: 0.3 % (ref 0–0.5)
LYMPHOCYTES # BLD AUTO: 3.6 K/UL (ref 1–4.8)
LYMPHOCYTES NFR BLD: 45.9 % (ref 18–48)
MCH RBC QN AUTO: 26.5 PG (ref 27–31)
MCHC RBC AUTO-ENTMCNC: 31.8 G/DL (ref 32–36)
MCV RBC AUTO: 83 FL (ref 82–98)
MONOCYTES # BLD AUTO: 0.5 K/UL (ref 0.3–1)
MONOCYTES NFR BLD: 6.6 % (ref 4–15)
NEUTROPHILS # BLD AUTO: 3.4 K/UL (ref 1.8–7.7)
NEUTROPHILS NFR BLD: 42.9 % (ref 38–73)
NRBC BLD-RTO: 0 /100 WBC
PLATELET # BLD AUTO: 335 K/UL (ref 150–450)
PMV BLD AUTO: 10 FL (ref 9.2–12.9)
RBC # BLD AUTO: 4.56 M/UL (ref 4–5.4)
TROPONIN I SERPL DL<=0.01 NG/ML-MCNC: 0.01 NG/ML (ref 0–0.03)
WBC # BLD AUTO: 7.93 K/UL (ref 3.9–12.7)

## 2024-05-06 PROCEDURE — 63600175 PHARM REV CODE 636 W HCPCS: Performed by: EMERGENCY MEDICINE

## 2024-05-06 PROCEDURE — 93010 ELECTROCARDIOGRAM REPORT: CPT | Mod: ,,, | Performed by: INTERNAL MEDICINE

## 2024-05-06 PROCEDURE — 99284 EMERGENCY DEPT VISIT MOD MDM: CPT | Mod: 25

## 2024-05-06 PROCEDURE — 85025 COMPLETE CBC W/AUTO DIFF WBC: CPT | Performed by: EMERGENCY MEDICINE

## 2024-05-06 PROCEDURE — 96374 THER/PROPH/DIAG INJ IV PUSH: CPT

## 2024-05-06 PROCEDURE — 80053 COMPREHEN METABOLIC PANEL: CPT | Performed by: EMERGENCY MEDICINE

## 2024-05-06 PROCEDURE — 25000003 PHARM REV CODE 250: Performed by: EMERGENCY MEDICINE

## 2024-05-06 PROCEDURE — 84484 ASSAY OF TROPONIN QUANT: CPT | Performed by: EMERGENCY MEDICINE

## 2024-05-06 PROCEDURE — 93005 ELECTROCARDIOGRAM TRACING: CPT

## 2024-05-06 PROCEDURE — 82550 ASSAY OF CK (CPK): CPT | Performed by: EMERGENCY MEDICINE

## 2024-05-06 RX ORDER — KETOROLAC TROMETHAMINE 30 MG/ML
15 INJECTION, SOLUTION INTRAMUSCULAR; INTRAVENOUS
Status: COMPLETED | OUTPATIENT
Start: 2024-05-06 | End: 2024-05-06

## 2024-05-06 RX ORDER — LIDOCAINE 50 MG/G
1 PATCH TOPICAL
Status: DISCONTINUED | OUTPATIENT
Start: 2024-05-06 | End: 2024-05-07 | Stop reason: HOSPADM

## 2024-05-06 RX ADMIN — KETOROLAC TROMETHAMINE 15 MG: 30 INJECTION, SOLUTION INTRAMUSCULAR at 10:05

## 2024-05-06 RX ADMIN — LIDOCAINE 1 PATCH: 50 PATCH CUTANEOUS at 10:05

## 2024-05-07 VITALS
HEIGHT: 64 IN | BODY MASS INDEX: 39.27 KG/M2 | OXYGEN SATURATION: 95 % | SYSTOLIC BLOOD PRESSURE: 138 MMHG | RESPIRATION RATE: 20 BRPM | TEMPERATURE: 98 F | DIASTOLIC BLOOD PRESSURE: 69 MMHG | HEART RATE: 94 BPM | WEIGHT: 230 LBS

## 2024-05-07 LAB
ALBUMIN SERPL BCP-MCNC: 3.1 G/DL (ref 3.5–5.2)
ALP SERPL-CCNC: 64 U/L (ref 55–135)
ALT SERPL W/O P-5'-P-CCNC: 17 U/L (ref 10–44)
ANION GAP SERPL CALC-SCNC: 11 MMOL/L (ref 8–16)
AST SERPL-CCNC: 16 U/L (ref 10–40)
BILIRUB SERPL-MCNC: 0.2 MG/DL (ref 0.1–1)
BUN SERPL-MCNC: 9 MG/DL (ref 6–20)
CALCIUM SERPL-MCNC: 8.6 MG/DL (ref 8.7–10.5)
CHLORIDE SERPL-SCNC: 108 MMOL/L (ref 95–110)
CK SERPL-CCNC: 124 U/L (ref 20–180)
CO2 SERPL-SCNC: 22 MMOL/L (ref 23–29)
CREAT SERPL-MCNC: 0.7 MG/DL (ref 0.5–1.4)
EST. GFR  (NO RACE VARIABLE): >60 ML/MIN/1.73 M^2
GLUCOSE SERPL-MCNC: 118 MG/DL (ref 70–110)
OHS QRS DURATION: 80 MS
OHS QTC CALCULATION: 440 MS
POTASSIUM SERPL-SCNC: 3.3 MMOL/L (ref 3.5–5.1)
PROT SERPL-MCNC: 6.3 G/DL (ref 6–8.4)
SODIUM SERPL-SCNC: 141 MMOL/L (ref 136–145)

## 2024-05-07 PROCEDURE — 94761 N-INVAS EAR/PLS OXIMETRY MLT: CPT

## 2024-05-07 PROCEDURE — 94640 AIRWAY INHALATION TREATMENT: CPT

## 2024-05-07 PROCEDURE — 25000003 PHARM REV CODE 250: Performed by: EMERGENCY MEDICINE

## 2024-05-07 PROCEDURE — 25000242 PHARM REV CODE 250 ALT 637 W/ HCPCS: Performed by: EMERGENCY MEDICINE

## 2024-05-07 RX ORDER — LIDOCAINE 50 MG/G
1 PATCH TOPICAL DAILY
Qty: 7 PATCH | Refills: 0 | Status: SHIPPED | OUTPATIENT
Start: 2024-05-07 | End: 2024-05-14

## 2024-05-07 RX ORDER — ORPHENADRINE CITRATE 100 MG/1
100 TABLET, EXTENDED RELEASE ORAL
Status: COMPLETED | OUTPATIENT
Start: 2024-05-07 | End: 2024-05-07

## 2024-05-07 RX ORDER — CYCLOBENZAPRINE HCL 10 MG
10 TABLET ORAL 3 TIMES DAILY PRN
Qty: 15 TABLET | Refills: 0 | Status: SHIPPED | OUTPATIENT
Start: 2024-05-07 | End: 2024-05-12

## 2024-05-07 RX ORDER — ALBUTEROL SULFATE 2.5 MG/.5ML
2.5 SOLUTION RESPIRATORY (INHALATION)
Status: COMPLETED | OUTPATIENT
Start: 2024-05-07 | End: 2024-05-07

## 2024-05-07 RX ORDER — ALBUTEROL SULFATE 90 UG/1
2 AEROSOL, METERED RESPIRATORY (INHALATION) EVERY 4 HOURS PRN
Qty: 18 G | Refills: 0 | Status: SHIPPED | OUTPATIENT
Start: 2024-05-07 | End: 2024-06-06

## 2024-05-07 RX ORDER — POTASSIUM CHLORIDE 20 MEQ/1
40 TABLET, EXTENDED RELEASE ORAL
Status: COMPLETED | OUTPATIENT
Start: 2024-05-07 | End: 2024-05-07

## 2024-05-07 RX ORDER — IBUPROFEN 600 MG/1
600 TABLET ORAL EVERY 8 HOURS PRN
Qty: 20 TABLET | Refills: 0 | Status: SHIPPED | OUTPATIENT
Start: 2024-05-07

## 2024-05-07 RX ADMIN — ORPHENADRINE CITRATE 100 MG: 100 TABLET, EXTENDED RELEASE ORAL at 12:05

## 2024-05-07 RX ADMIN — POTASSIUM CHLORIDE 40 MEQ: 1500 TABLET, EXTENDED RELEASE ORAL at 12:05

## 2024-05-07 RX ADMIN — ALBUTEROL SULFATE 2.5 MG: 2.5 SOLUTION RESPIRATORY (INHALATION) at 12:05

## 2024-05-07 NOTE — ED PROVIDER NOTES
"Encounter Date: 2024    SCRIBE #1 NOTE: I, Esperanza Stallworth, am scribing for, and in the presence of,  Jaclyn Mercado MD. I have scribed the following portions of the note - Other sections scribed: HPI, ROS.       History     Chief Complaint   Patient presents with    Chest Pain     C/o DIPIKA chest pain for 2 weeks. Rates pain 10/10 that increases with movement of lifting. Pain is reproducible on the Right side. Pt states pain radiates to her shoulders. Denies SOB, denies dizziness.     This is a 43 y.o. female, with a history of HTN, diabetes, and foot surgery, who presents with complaint of right sided chest pain beginning over 2 weeks ago. She describes the pain as a pressure currently on her dominant side but reports that it moves from side to side. She also notes a cough that occurs when she takes a deep breath first occurring 2 days ago. 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 She denies any injury or previous episodes of this type of pain prior to 2 weeks ago. She notes a history of asthmatic bronchitis. She notes that her mother had CHF and blockages but notes that it was only in old age. She reports trying "hot coke" to relieve the pain, followed by chewable and gel tablet gas-x, but neither helped. She denies any diet changes, worsening or improvement with digestion, nausea, vomiting, diarrhea, belly pain, back pain, or history of smoking.       The history is provided by the patient and a relative.     Review of patient's allergies indicates:   Allergen Reactions    Asa [aspirin]      Tongue lump up and nauseated     Past Medical History:   Diagnosis Date    Diabetes mellitus     Hypertension      Past Surgical History:   Procedure Laterality Date    ANKLE SURGERY Bilateral      SECTION      HYSTERECTOMY       No family history on file.  Social History "     Tobacco Use    Smoking status: Never    Smokeless tobacco: Never   Substance Use Topics    Alcohol use: Yes     Comment: socially    Drug use: No     Review of Systems   Constitutional:  Negative for activity change.   Respiratory:  Positive for cough.    Cardiovascular:  Positive for chest pain.   Gastrointestinal:  Negative for abdominal pain, diarrhea, nausea and vomiting.   Musculoskeletal:  Negative for back pain.       Physical Exam     Initial Vitals [05/06/24 2044]   BP Pulse Resp Temp SpO2   (!) 147/82 93 20 98.2 °F (36.8 °C) 97 %      MAP       --         Physical Exam    Nursing note and vitals reviewed.  Constitutional: She appears well-developed and well-nourished. She is not diaphoretic. No distress.   HENT:   Head: Normocephalic and atraumatic.   Right Ear: External ear normal.   Left Ear: External ear normal.   Nose: Nose normal.   Eyes: Conjunctivae and EOM are normal. Right eye exhibits no discharge. Left eye exhibits no discharge.   Neck: Neck supple. No JVD present.   Cardiovascular:  Normal rate, regular rhythm and normal heart sounds.     Exam reveals no gallop and no friction rub.       No murmur heard.  Pulmonary/Chest: Breath sounds normal. No respiratory distress. She has no wheezes. She has no rhonchi. She has no rales. She exhibits tenderness.   Right upper chest wall tenderness to palpation   Abdominal: Abdomen is soft. She exhibits no distension. There is no abdominal tenderness. There is no rebound and no guarding.   Musculoskeletal:         General: Normal range of motion.      Cervical back: Neck supple.     Neurological: She is alert and oriented to person, place, and time. She has normal strength. No sensory deficit.   Skin: Skin is warm. No rash and no abscess noted. No erythema.        ED Course   Procedures  Labs Reviewed   CBC W/ AUTO DIFFERENTIAL - Abnormal; Notable for the following components:       Result Value    MCH 26.5 (*)     MCHC 31.8 (*)     All other components  within normal limits   COMPREHENSIVE METABOLIC PANEL - Abnormal; Notable for the following components:    Potassium 3.3 (*)     CO2 22 (*)     Glucose 118 (*)     Calcium 8.6 (*)     Albumin 3.1 (*)     All other components within normal limits   TROPONIN I   CK     EKG Readings: (Independently Interpreted)   Normal sinus rhythm, heart rate 88, normal intervals, no significant ST/T-wave changes     ECG Results              EKG 12-lead (Final result)        Collection Time Result Time QRS Duration OHS QTC Calculation    05/06/24 20:49:55 05/07/24 13:28:23 80 440                     Final result by Interface, Lab In [facility] (05/07/24 13:29:05)                   Narrative:    Test Reason : R07.9,    Vent. Rate : 088 BPM     Atrial Rate : 088 BPM     P-R Int : 154 ms          QRS Dur : 080 ms      QT Int : 364 ms       P-R-T Axes : 030 002 014 degrees     QTc Int : 440 ms    Normal sinus rhythm  Possible Anterior infarct ,age undetermined  Abnormal ECG    Confirmed by Jakob WAITE, Juan ANGULO ([id number]) on 5/7/2024 1:28:21 PM    Referred By: [email or web address]   SELF           Confirmed By:Juan Robert MD                                  Imaging Results    None          Medications   ketorolac injection 15 mg (15 mg Intravenous Given 5/6/24 2250)   potassium chloride SA CR tablet 40 mEq (40 mEq Oral Given 5/7/24 0034)   orphenadrine 12 hr tablet 100 mg (100 mg Oral Given 5/7/24 0034)   albuterol sulfate nebulizer solution 2.5 mg (2.5 mg Nebulization Given 5/7/24 0048)     Medical Decision Making  43-year-old female presents for evaluation of right-sided chest pain and shoulder pain.  Pain is aggravated by movement/positional changes.  In addition she also reports bronchospastic coughing with a history of asthma.    Chest pain most consistent with musculoskeletal etiology.  EKG without acute ischemic changes or dysrhythmia.  CBC, CMP, and troponin without significant abnormalities.  Pain relieved with lidocaine patch and Toradol.  She  experienced further relief with Norflex.    Bronchospastic coughing improved significantly after 1 albuterol neb. will prescribe an albuterol inhaler.      Amount and/or Complexity of Data Reviewed  Labs: ordered.    Risk  Prescription drug management.            Scribe Attestation:   Scribe #1: I performed the above scribed service and the documentation accurately describes the services I performed. I attest to the accuracy of the note.                               Clinical Impression:  Final diagnoses:  [R07.9] Chest pain  [R07.9] Chest pain of unknown etiology (Primary)          ED Disposition Condition    Discharge Stable          ED Prescriptions       Medication Sig Dispense Start Date End Date Auth. Provider    cyclobenzaprine (FLEXERIL) 10 MG tablet Take 1 tablet (10 mg total) by mouth 3 (three) times daily as needed for Muscle spasms. 15 tablet 5/7/2024 5/12/2024 Jaclyn Mercado MD    LIDOcaine (LIDODERM) 5 % Place 1 patch onto the skin once daily. Remove & Discard patch within 12 hours or as directed by MD for 7 days 7 patch 5/7/2024 5/14/2024 Jaclyn Mercado MD    ibuprofen (ADVIL,MOTRIN) 600 MG tablet Take 1 tablet (600 mg total) by mouth every 8 (eight) hours as needed for Pain. 20 tablet 5/7/2024 -- Jaclyn Mercado MD    albuterol (PROVENTIL/VENTOLIN HFA) 90 mcg/actuation inhaler Inhale 2 puffs into the lungs every 4 (four) hours as needed for Wheezing or Shortness of Breath. 18 g 5/7/2024 6/6/2024 Jaclyn Mercado MD          Follow-up Information       Follow up With Specialties Details Why Contact Info    Adriel Mcbride Jr., MD Hospitalist, Family Medicine Schedule an appointment as soon as possible for a visit on 5/10/2024 For re-evaluation [address]  [address]  [facility] [id number] [phone]               Jaclyn Mercado MD  05/08/24 6604

## 2024-07-16 ENCOUNTER — HOSPITAL ENCOUNTER (EMERGENCY)
Facility: OTHER | Age: 44
Discharge: HOME | End: 2024-07-16
Attending: EMERGENCY MEDICINE

## 2024-07-16 VITALS
RESPIRATION RATE: 20 BRPM | SYSTOLIC BLOOD PRESSURE: 174 MMHG | WEIGHT: 225 LBS | BODY MASS INDEX: 38.41 KG/M2 | HEIGHT: 64 IN | TEMPERATURE: 98 F | DIASTOLIC BLOOD PRESSURE: 90 MMHG | OXYGEN SATURATION: 97 % | HEART RATE: 69 BPM

## 2024-07-16 DIAGNOSIS — R04.0 EPISTAXIS: ICD-10-CM

## 2024-07-16 DIAGNOSIS — R04.2 HEMOPTYSIS: ICD-10-CM

## 2024-07-16 DIAGNOSIS — M54.50 LUMBAR PAIN: Primary | ICD-10-CM

## 2024-07-16 LAB
BILIRUB UR QL STRIP: NEGATIVE
CLARITY UR: CLEAR
COLOR UR: YELLOW
GLUCOSE UR QL STRIP: NEGATIVE
HGB UR QL STRIP: NEGATIVE
KETONES UR QL STRIP: NEGATIVE
LEUKOCYTE ESTERASE UR QL STRIP: NEGATIVE
NITRITE UR QL STRIP: NEGATIVE
PH UR STRIP: 6 [PH] (ref 5–8)
POCT GLUCOSE: 165 MG/DL (ref 70–110)
PROT UR QL STRIP: NEGATIVE
SP GR UR STRIP: 1.02 (ref 1–1.03)
URN SPEC COLLECT METH UR: NORMAL
UROBILINOGEN UR STRIP-ACNC: NEGATIVE EU/DL

## 2024-07-16 PROCEDURE — 96372 THER/PROPH/DIAG INJ SC/IM: CPT | Performed by: NURSE PRACTITIONER

## 2024-07-16 PROCEDURE — 99284 EMERGENCY DEPT VISIT MOD MDM: CPT | Mod: 25

## 2024-07-16 PROCEDURE — 82962 GLUCOSE BLOOD TEST: CPT

## 2024-07-16 PROCEDURE — 81003 URINALYSIS AUTO W/O SCOPE: CPT | Performed by: NURSE PRACTITIONER

## 2024-07-16 PROCEDURE — 63600175 PHARM REV CODE 636 W HCPCS: Performed by: NURSE PRACTITIONER

## 2024-07-16 RX ORDER — METHOCARBAMOL 500 MG/1
500 TABLET, FILM COATED ORAL 4 TIMES DAILY
Qty: 40 TABLET | Refills: 0 | Status: SHIPPED | OUTPATIENT
Start: 2024-07-16 | End: 2024-07-26

## 2024-07-16 RX ORDER — KETOROLAC TROMETHAMINE 30 MG/ML
30 INJECTION, SOLUTION INTRAMUSCULAR; INTRAVENOUS
Status: COMPLETED | OUTPATIENT
Start: 2024-07-16 | End: 2024-07-16

## 2024-07-16 RX ORDER — IBUPROFEN 800 MG/1
800 TABLET ORAL EVERY 6 HOURS PRN
Qty: 20 TABLET | Refills: 0 | Status: SHIPPED | OUTPATIENT
Start: 2024-07-16 | End: 2024-07-16

## 2024-07-16 RX ORDER — METHOCARBAMOL 500 MG/1
500 TABLET, FILM COATED ORAL 4 TIMES DAILY
Qty: 40 TABLET | Refills: 0 | Status: SHIPPED | OUTPATIENT
Start: 2024-07-16 | End: 2024-07-16

## 2024-07-16 RX ORDER — IBUPROFEN 800 MG/1
800 TABLET ORAL EVERY 6 HOURS PRN
Qty: 20 TABLET | Refills: 0 | Status: SHIPPED | OUTPATIENT
Start: 2024-07-16

## 2024-07-16 RX ADMIN — KETOROLAC TROMETHAMINE 30 MG: 30 INJECTION, SOLUTION INTRAMUSCULAR; INTRAVENOUS at 08:07

## 2024-07-16 NOTE — Clinical Note
"Car Hupmhreyinocencio"Sophie was seen and treated in our emergency department on 7/16/2024.  She may return to work on 07/18/2024.       If you have any questions or concerns, please don't hesitate to call.      VIJAYA hayes RN    "

## 2024-07-17 NOTE — ED TRIAGE NOTES
"Pt presents to eD today reports nose bleed today and "coughed up blood"   Right flank pain x 2 weeks denies associated urinary sx   "

## 2024-07-17 NOTE — ED PROVIDER NOTES
Encounter Date: 2024       History     Chief Complaint   Patient presents with    Back Pain    Hemoptysis     Reports R lower back pain onset 1 week ago, denies injuries/trauma. Denies  s/s. Reports one episode of coughing up blood this afternoon. Denies having a recent URI or cough. States it happened out of nowhere.      43-year-old female with DM and HTN which presents to the emergency room with right lower back pain that began 1 week ago.  She denies any trauma or injury.  She has a history of right-sided lower back pain that she usually takes muscle relaxers for but does not feel it is helping this time.  She denies any weakness in her legs or urinary/bowel incontinence.  No other complaints at this time.  She was also concerned that she had coughed up blood but upon further discussion with the patient she was actually having a nosebleed.    The history is provided by the patient.     Review of patient's allergies indicates:   Allergen Reactions    Asa [aspirin]      Tongue lump up and nauseated     Past Medical History:   Diagnosis Date    Diabetes mellitus     Hypertension      Past Surgical History:   Procedure Laterality Date    ANKLE SURGERY Bilateral      SECTION      HYSTERECTOMY       No family history on file.  Social History     Tobacco Use    Smoking status: Never    Smokeless tobacco: Never   Substance Use Topics    Alcohol use: Yes     Comment: socially    Drug use: No     Review of Systems   Constitutional:  Negative for fever.   HENT:  Negative for sore throat.    Respiratory:  Negative for shortness of breath.    Cardiovascular:  Negative for chest pain.   Gastrointestinal:  Negative for nausea.   Genitourinary:  Negative for dysuria.   Musculoskeletal:  Positive for back pain.   Skin:  Negative for rash.   Neurological:  Negative for weakness.   Hematological:  Does not bruise/bleed easily.   All other systems reviewed and are negative.      Physical Exam     Initial Vitals  [07/16/24 1839]   BP Pulse Resp Temp SpO2   (!) 162/91 76 18 98.1 °F (36.7 °C) 97 %      MAP       --         Physical Exam    Nursing note and vitals reviewed.  Constitutional: She appears well-developed and well-nourished.   HENT:   Head: Normocephalic and atraumatic.   No epistaxis noted   Eyes: Conjunctivae and EOM are normal. Pupils are equal, round, and reactive to light.   Neck:   Normal range of motion.  Cardiovascular:  Normal rate, regular rhythm, normal heart sounds and intact distal pulses.     Exam reveals no gallop and no friction rub.       No murmur heard.  Pulmonary/Chest: Breath sounds normal. No respiratory distress. She has no wheezes. She has no rhonchi. She has no rales. She exhibits no tenderness.   Abdominal: Abdomen is soft. Bowel sounds are normal. She exhibits no distension and no mass. There is no abdominal tenderness. There is no rebound and no guarding.   Musculoskeletal:         General: Tenderness present. No edema. Normal range of motion.      Cervical back: Normal range of motion.        Back:       Comments: Right-sided lower back pain-no bony tenderness-no spasm noted on exam-no erythema of skin     Neurological: She is alert and oriented to person, place, and time. She has normal strength. GCS score is 15. GCS eye subscore is 4. GCS verbal subscore is 5. GCS motor subscore is 6.   Skin: Skin is warm. Capillary refill takes less than 2 seconds. No rash noted. No erythema.   Psychiatric: She has a normal mood and affect.       ED Course   Procedures  Labs Reviewed   POCT GLUCOSE - Abnormal; Notable for the following components:       Result Value    POCT Glucose 165 (*)     All other components within normal limits   URINALYSIS, REFLEX TO URINE CULTURE    Narrative:     Specimen Source->Urine   POCT GLUCOSE MONITORING CONTINUOUS          Imaging Results              X-Ray Chest PA And Lateral (Final result)  Result time 07/16/24 19:56:02      Final result by Ken Odell MD  (07/16/24 19:56:02)                   Impression:      No acute abnormality.      Electronically signed by: Ken Odell  Date:    07/16/2024  Time:    19:56               Narrative:    EXAMINATION:  XR CHEST PA AND LATERAL    CLINICAL HISTORY:  Hemoptysis    TECHNIQUE:  PA and lateral views of the chest were performed.    COMPARISON:  09/11/2023    FINDINGS:  The lungs are clear, with normal appearance of pulmonary vasculature and no pleural effusion or pneumothorax.    The cardiac silhouette is normal in size. The hilar and mediastinal contours are unremarkable.    Bones are intact.                                       Medications   ketorolac injection 30 mg (30 mg Intramuscular Given 7/16/24 2048)     Medical Decision Making  43-year-old female which presents to the emergency room with a lumbar strain.  No evidence of cauda equina on exam.  Her urinalysis is normal.  Her sugar is 165.  She has been advised to take ibuprofen and Robaxin to help with her pain.  She has also been provided sciatica stretches. Patient given strict return precautions and voiced understanding of all discharge instructions. Pt was stable at discharge.       Differential diagnosis:  Lumbar strain, UTI, pyelonephritis, kidney stone    Problems Addressed:  Epistaxis: acute illness or injury  Hemoptysis: self-limited or minor problem  Lumbar pain: acute illness or injury    Amount and/or Complexity of Data Reviewed  Radiology: ordered.    Risk  Prescription drug management.               ED Course as of 07/16/24 2222 Tue Jul 16, 2024 2025 BP(!): 162/91 [AT]   2025 Temp: 98.1 °F (36.7 °C) [AT]   2025 Temp Source: Oral [AT]   2025 Pulse: 76 [AT]   2025 Resp: 18 [AT]   2025 SpO2: 97 % [AT]      ED Course User Index  [AT] Vanessa Deutsch FNP                           Clinical Impression:  Final diagnoses:  [R04.2] Hemoptysis  [M54.50] Lumbar pain (Primary)  [R04.0] Epistaxis          ED Disposition Condition    Discharge Stable           ED Prescriptions       Medication Sig Dispense Start Date End Date Auth. Provider    ibuprofen (ADVIL,MOTRIN) 800 MG tablet  (Status: Discontinued) Take 1 tablet (800 mg total) by mouth every 6 (six) hours as needed for Pain. 20 tablet 7/16/2024 7/16/2024 Vanessa Deutsch FNP    methocarbamoL (ROBAXIN) 500 MG Tab  (Status: Discontinued) Take 1 tablet (500 mg total) by mouth 4 (four) times daily. for 10 days 40 tablet 7/16/2024 7/16/2024 Vanessa Deutsch FNP    ibuprofen (ADVIL,MOTRIN) 800 MG tablet  (Status: Discontinued) Take 1 tablet (800 mg total) by mouth every 6 (six) hours as needed for Pain. 20 tablet 7/16/2024 7/16/2024 Vanessa Deutsch FNP    methocarbamoL (ROBAXIN) 500 MG Tab  (Status: Discontinued) Take 1 tablet (500 mg total) by mouth 4 (four) times daily. for 10 days 40 tablet 7/16/2024 7/16/2024 Vanessa Deutsch FNP    ibuprofen (ADVIL,MOTRIN) 800 MG tablet Take 1 tablet (800 mg total) by mouth every 6 (six) hours as needed for Pain. 20 tablet 7/16/2024 -- Vanessa Deutsch FNP    methocarbamoL (ROBAXIN) 500 MG Tab Take 1 tablet (500 mg total) by mouth 4 (four) times daily. for 10 days 40 tablet 7/16/2024 7/26/2024 Vanessa Deutsch FNP          Follow-up Information       Follow up With Specialties Details Why Contact Info    Adriel Mcbride Jr., MD Hospitalist, Family Medicine Schedule an appointment as soon as possible for a visit  As needed [address] [facility]  [address]  [facility] [id number]  [phone]               Vanessa Deutsch FNP  07/16/24 2221

## 2024-11-18 NOTE — DISCHARGE INSTRUCTIONS
Please use Flonase and Sudafed as needed for sinus pressure and congestion.  Please be aware that Sudafed can raise your blood pressure.  Please use Zyrtec as needed for runny nose, postnasal drip, and sneezing.  Please follow-up with ENT if your symptoms persist or worsen.  A referral has been placed.  Please make sure you stay hydrated with fluids containing electrolytes.  Take Tylenol and ibuprofen as needed for fever, headache and pain.   Working on lower carb eating plan

## 2024-11-25 NOTE — ED NOTES
Pt presented to ED via POV with complaints of urinary frequency with vaginal itching x1 day pta. Pt has hx of DM and is compliant with current medication. Denies any other pain or distress, denies dysuria or blood noted in urine. AAOx4, VSS. Side rails up x2, call light within reach, bed in lowest locked position, will continue to monitor and assess for changes.   
General Sunscreen Counseling: I recommended a broad spectrum sunscreen with a SPF of 30 or higher.  I explained that SPF 30 sunscreens block approximately 97 percent of the sun's harmful rays.  Sunscreens should be applied at least 15 minutes prior to expected sun exposure and then every 2 hours after that as long as sun exposure continues. If swimming or exercising sunscreen should be reapplied every 45 minutes to an hour after getting wet or sweating.  One ounce, or the equivalent of a shot glass full of sunscreen, is adequate to protect the skin not covered by a bathing suit. I also recommended a lip balm with a sunscreen as well. Sun protective clothing can be used in lieu of sunscreen but must be worn the entire time you are exposed to the sun's rays.
Detail Level: Generalized

## 2025-01-09 ENCOUNTER — HOSPITAL ENCOUNTER (EMERGENCY)
Facility: HOSPITAL | Age: 45
Discharge: HOME | End: 2025-01-09
Attending: EMERGENCY MEDICINE

## 2025-01-09 VITALS
SYSTOLIC BLOOD PRESSURE: 161 MMHG | OXYGEN SATURATION: 98 % | RESPIRATION RATE: 16 BRPM | HEIGHT: 64 IN | WEIGHT: 223 LBS | HEART RATE: 72 BPM | BODY MASS INDEX: 38.07 KG/M2 | DIASTOLIC BLOOD PRESSURE: 87 MMHG | TEMPERATURE: 98 F

## 2025-01-09 DIAGNOSIS — K04.01 ACUTE PULPITIS: Primary | ICD-10-CM

## 2025-01-09 DIAGNOSIS — M54.50 LUMBAR PAIN: ICD-10-CM

## 2025-01-09 PROCEDURE — 99283 EMERGENCY DEPT VISIT LOW MDM: CPT

## 2025-01-09 PROCEDURE — 25000003 PHARM REV CODE 250: Performed by: EMERGENCY MEDICINE

## 2025-01-09 RX ORDER — AMOXICILLIN AND CLAVULANATE POTASSIUM 875; 125 MG/1; MG/1
1 TABLET, FILM COATED ORAL
Status: COMPLETED | OUTPATIENT
Start: 2025-01-09 | End: 2025-01-09

## 2025-01-09 RX ORDER — AMOXICILLIN AND CLAVULANATE POTASSIUM 875; 125 MG/1; MG/1
1 TABLET, FILM COATED ORAL 2 TIMES DAILY
Qty: 14 TABLET | Refills: 0 | Status: SHIPPED | OUTPATIENT
Start: 2025-01-09

## 2025-01-09 RX ORDER — IBUPROFEN 800 MG/1
800 TABLET ORAL EVERY 6 HOURS PRN
Qty: 20 TABLET | Refills: 0 | Status: SHIPPED | OUTPATIENT
Start: 2025-01-09

## 2025-01-09 RX ORDER — HYDROCODONE BITARTRATE AND ACETAMINOPHEN 5; 325 MG/1; MG/1
1 TABLET ORAL
Status: COMPLETED | OUTPATIENT
Start: 2025-01-09 | End: 2025-01-09

## 2025-01-09 RX ORDER — ONDANSETRON 4 MG/1
4 TABLET, ORALLY DISINTEGRATING ORAL
Status: COMPLETED | OUTPATIENT
Start: 2025-01-09 | End: 2025-01-09

## 2025-01-09 RX ADMIN — HYDROCODONE BITARTRATE AND ACETAMINOPHEN 1 TABLET: 5; 325 TABLET ORAL at 10:01

## 2025-01-09 RX ADMIN — AMOXICILLIN AND CLAVULANATE POTASSIUM 1 TABLET: 875; 125 TABLET, FILM COATED ORAL at 10:01

## 2025-01-09 RX ADMIN — ONDANSETRON 4 MG: 4 TABLET, ORALLY DISINTEGRATING ORAL at 10:01

## 2025-01-09 NOTE — Clinical Note
"Car Barrios ([place]) was seen and treated in our emergency department on 1/9/2025.  She may return to work on 01/13/2025.       If you have any questions or concerns, please don't hesitate to call.       RN    "

## 2025-01-10 NOTE — ED PROVIDER NOTES
Encounter Date: 2025       History     Chief Complaint   Patient presents with    Dental Pain     Left dental and ear pain x 3-4 days     The patient is a 44-year-old female who came to the emergency department with dental pain to her left lower molar for the past few days.  She states that when she eats something cold since electric shocks up her jaw to her ear.  She states she feels that she is hearing under water from her left ear.  No fever, no swelling.      Review of patient's allergies indicates:   Allergen Reactions    Asa [aspirin]      Tongue lump up and nauseated     Past Medical History:   Diagnosis Date    Diabetes mellitus     Hypertension      Past Surgical History:   Procedure Laterality Date    ANKLE SURGERY Bilateral      SECTION      HYSTERECTOMY       No family history on file.  Social History     Tobacco Use    Smoking status: Never    Smokeless tobacco: Never   Substance Use Topics    Alcohol use: Yes     Comment: socially    Drug use: No     Review of Systems   All other systems reviewed and are negative.      Physical Exam     Initial Vitals [25 1932]   BP Pulse Resp Temp SpO2   (!) 161/87 72 16 98.3 °F (36.8 °C) 98 %      MAP       --         Physical Exam    Nursing note and vitals reviewed.  Constitutional: She appears well-developed and well-nourished.   HENT:   Head: Normocephalic and atraumatic.   Left lower molar with tenderness to percussion, no swelling, no gingival swelling.  TMs intact with no fluid in the middle ear.  No erythema     Lymphadenopathy:     She has no cervical adenopathy.         ED Course   Procedures  Labs Reviewed - No data to display       Imaging Results    None          Medications   HYDROcodone-acetaminophen 5-325 mg per tablet 1 tablet (has no administration in time range)   amoxicillin-clavulanate 875-125mg per tablet 1 tablet (has no administration in time range)   ondansetron disintegrating tablet 4 mg (has no administration in time  range)     Medical Decision Making  Differential diagnosis includes pulpitis, dental abscess, fractured tooth    Medical decision-making this is a 44-year-old female with dental pain to percussion most consistent with pulpitis.  She does not have an abscess.  I will start her on Augmentin and give her a Norco tonight.  She give her ibuprofen 800 mg as needed for the pain and Augmentin b.i.d. for 7 days.  She needs to follow up with a dentist.    Risk  Prescription drug management.                                      Clinical Impression:  Final diagnoses:  [K04.01] Acute pulpitis (Primary)          ED Disposition Condition    Discharge Stable          ED Prescriptions       Medication Sig Dispense Start Date End Date Auth. Provider    amoxicillin-clavulanate 875-125mg (AUGMENTIN) 875-125 mg per tablet Take 1 tablet by mouth 2 (two) times daily. 14 tablet 1/9/2025 -- Ana Tijerina MD    ibuprofen (ADVIL,MOTRIN) 800 MG tablet Take 1 tablet (800 mg total) by mouth every 6 (six) hours as needed for Pain. 20 tablet 1/9/2025 -- Ana Tijerina MD          Follow-up Information       Follow up With Specialties Details Why Contact Info    Follow up with your dentist or dental clinic next week                 Ana Tijerina MD  01/09/25 1177

## 2025-01-10 NOTE — ED NOTES
"Patient arrived to ED via private vehicle with 10/10 L lower tooth pain x 4 days with pain radiating to L ear. Denies changes in hearing, injury or trauma, drainage from ear. Reports "it feels like a nerve is exposed because every time I eat or drink something hot or cold it makes the pain worse." Reports has a dentist appointment in a could days but could not take the pain any longer. Reports taking OTC tylenol, aleve, advil, and oral gel numbing gel with only mild relief. Reports unable to sleep. Speech clear. Tolerating oral secretions. Breathing unlabored and even.     APPEARANCE: Alert, oriented and in no acute distress.  CARDIAC: Normal rate and rhythm, no murmur heard.   PERIPHERAL VASCULAR: peripheral pulses present. Normal cap refill. No edema. Warm to touch.    RESPIRATORY:Normal rate and effort, breath sounds clear bilaterally throughout chest. Respirations are equal and unlabored no obvious signs of distress.  GASTRO: soft, bowel sounds normal, no tenderness, no abdominal distention.  MUSC: Full ROM. No bony tenderness or soft tissue tenderness. No obvious deformity.  SKIN: Skin is warm and dry, normal skin turgor, mucous membranes moist.  NEURO: 5/5 strength major flexors/extensors bilaterally. Sensory intact to light touch bilaterally. [place] coma scale: eyes open spontaneously-4, oriented & converses-5, obeys commands-6. No neurological abnormalities.   MENTAL STATUS: awake, alert and aware of environment.  EYE: PERRL, both eyes: pupils brisk and reactive to light. Normal size.  ENT: EARS: no obvious drainage. NOSE: no active bleeding. L ear pain and fullness. MOUTH: L side of face slightly swollen. Pain to L lower side of teeth.     "

## 2025-01-10 NOTE — FIRST PROVIDER EVALUATION
Emergency Department TeleTriage Encounter Note      CHIEF COMPLAINT    Chief Complaint   Patient presents with    Dental Pain     Left dental and ear pain x 3-4 days       VITAL SIGNS   Initial Vitals [01/09/25 1932]   BP Pulse Resp Temp SpO2   (!) 161/87 72 16 98.3 °F (36.8 °C) 98 %      MAP       --            ALLERGIES    Review of patient's allergies indicates:   Allergen Reactions    Asa [aspirin]      Tongue lump up and nauseated       PROVIDER TRIAGE NOTE  44 year old female presents to the ER with complaints of left lower dental pain radiating into left ear x 4 days. No fever. No dental injury. Reports no runny nose, congestion, sore throat or cough.     AAOx3, respirations even and non- labored, stable vitals, normal coloration of skin, sitting upright in triage chair, appears in no acute distress.          ORDERS  Labs Reviewed - No data to display    ED Orders (720h ago, onward)      None              Virtual Visit Note: The provider triage portion of this emergency department evaluation and documentation was performed via [employer], a HIPAA-compliant telemedicine application, in concert with a tele-presenter in the room. A face to face patient evaluation with one of my colleagues will occur once the patient is placed in an emergency department room.      DISCLAIMER: This note was prepared with [employer] voice recognition transcription software. Garbled syntax, mangled pronouns, and other bizarre constructions may be attributed to that software system.

## 2025-02-17 ENCOUNTER — HOSPITAL ENCOUNTER (EMERGENCY)
Facility: OTHER | Age: 45
Discharge: HOME | End: 2025-02-17
Attending: EMERGENCY MEDICINE

## 2025-02-17 VITALS
TEMPERATURE: 98 F | DIASTOLIC BLOOD PRESSURE: 80 MMHG | WEIGHT: 223 LBS | OXYGEN SATURATION: 100 % | HEART RATE: 83 BPM | SYSTOLIC BLOOD PRESSURE: 145 MMHG | BODY MASS INDEX: 38.28 KG/M2 | RESPIRATION RATE: 20 BRPM

## 2025-02-17 DIAGNOSIS — R35.0 URINARY FREQUENCY: ICD-10-CM

## 2025-02-17 DIAGNOSIS — S39.012A LUMBAR STRAIN, INITIAL ENCOUNTER: Primary | ICD-10-CM

## 2025-02-17 DIAGNOSIS — R73.9 HYPERGLYCEMIA: ICD-10-CM

## 2025-02-17 LAB
ALBUMIN SERPL BCP-MCNC: 3.7 G/DL (ref 3.5–5.2)
ALP SERPL-CCNC: 81 U/L (ref 40–150)
ALT SERPL W/O P-5'-P-CCNC: 27 U/L (ref 10–44)
ANION GAP SERPL CALC-SCNC: 9 MMOL/L (ref 8–16)
AST SERPL-CCNC: 24 U/L (ref 10–40)
B-OH-BUTYR BLD STRIP-SCNC: 0.1 MMOL/L (ref 0–0.5)
BACTERIA #/AREA URNS HPF: ABNORMAL /HPF
BASOPHILS # BLD AUTO: 0.07 K/UL (ref 0–0.2)
BASOPHILS NFR BLD: 1.2 % (ref 0–1.9)
BILIRUB SERPL-MCNC: 0.4 MG/DL (ref 0.1–1)
BILIRUB UR QL STRIP: NEGATIVE
BUN SERPL-MCNC: 9 MG/DL (ref 6–20)
CALCIUM SERPL-MCNC: 9.3 MG/DL (ref 8.7–10.5)
CHLORIDE SERPL-SCNC: 101 MMOL/L (ref 95–110)
CLARITY UR: CLEAR
CO2 SERPL-SCNC: 25 MMOL/L (ref 23–29)
COLOR UR: YELLOW
CREAT SERPL-MCNC: 0.9 MG/DL (ref 0.5–1.4)
DIFFERENTIAL METHOD BLD: ABNORMAL
EOSINOPHIL # BLD AUTO: 0.2 K/UL (ref 0–0.5)
EOSINOPHIL NFR BLD: 3.8 % (ref 0–8)
ERYTHROCYTE [DISTWIDTH] IN BLOOD BY AUTOMATED COUNT: 14.3 % (ref 11.5–14.5)
EST. GFR  (NO RACE VARIABLE): >60 ML/MIN/1.73 M^2
GLUCOSE SERPL-MCNC: 414 MG/DL (ref 70–110)
GLUCOSE UR QL STRIP: ABNORMAL
HCT VFR BLD AUTO: 38.1 % (ref 37–48.5)
HCV AB SERPL QL IA: NEGATIVE
HGB BLD-MCNC: 12.5 G/DL (ref 12–16)
HGB UR QL STRIP: NEGATIVE
HIV 1+2 AB+HIV1 P24 AG SERPL QL IA: NEGATIVE
IMM GRANULOCYTES # BLD AUTO: 0.05 K/UL (ref 0–0.04)
IMM GRANULOCYTES NFR BLD AUTO: 0.8 % (ref 0–0.5)
KETONES UR QL STRIP: NEGATIVE
LEUKOCYTE ESTERASE UR QL STRIP: NEGATIVE
LYMPHOCYTES # BLD AUTO: 2.4 K/UL (ref 1–4.8)
LYMPHOCYTES NFR BLD: 39.5 % (ref 18–48)
MCH RBC QN AUTO: 27.5 PG (ref 27–31)
MCHC RBC AUTO-ENTMCNC: 32.8 G/DL (ref 32–36)
MCV RBC AUTO: 84 FL (ref 82–98)
MICROSCOPIC COMMENT: ABNORMAL
MONOCYTES # BLD AUTO: 0.4 K/UL (ref 0.3–1)
MONOCYTES NFR BLD: 7.1 % (ref 4–15)
NEUTROPHILS # BLD AUTO: 2.9 K/UL (ref 1.8–7.7)
NEUTROPHILS NFR BLD: 47.6 % (ref 38–73)
NITRITE UR QL STRIP: NEGATIVE
NRBC BLD-RTO: 0 /100 WBC
PH UR STRIP: 7 [PH] (ref 5–8)
PLATELET # BLD AUTO: 290 K/UL (ref 150–450)
PMV BLD AUTO: 10 FL (ref 9.2–12.9)
POCT GLUCOSE: 267 MG/DL (ref 70–110)
POCT GLUCOSE: 327 MG/DL (ref 70–110)
POCT GLUCOSE: 382 MG/DL (ref 70–110)
POCT GLUCOSE: 395 MG/DL (ref 70–110)
POTASSIUM SERPL-SCNC: 4.3 MMOL/L (ref 3.5–5.1)
PROT SERPL-MCNC: 7.5 G/DL (ref 6–8.4)
PROT UR QL STRIP: NEGATIVE
RBC # BLD AUTO: 4.54 M/UL (ref 4–5.4)
SODIUM SERPL-SCNC: 135 MMOL/L (ref 136–145)
SP GR UR STRIP: >1.03 (ref 1–1.03)
SQUAMOUS #/AREA URNS HPF: 2 /HPF
URN SPEC COLLECT METH UR: ABNORMAL
UROBILINOGEN UR STRIP-ACNC: NEGATIVE EU/DL
WBC # BLD AUTO: 6.03 K/UL (ref 3.9–12.7)
YEAST URNS QL MICRO: ABNORMAL

## 2025-02-17 PROCEDURE — 93005 ELECTROCARDIOGRAM TRACING: CPT

## 2025-02-17 PROCEDURE — 25000003 PHARM REV CODE 250: Performed by: NURSE PRACTITIONER

## 2025-02-17 PROCEDURE — 82962 GLUCOSE BLOOD TEST: CPT

## 2025-02-17 PROCEDURE — 80053 COMPREHEN METABOLIC PANEL: CPT | Performed by: NURSE PRACTITIONER

## 2025-02-17 PROCEDURE — 85025 COMPLETE CBC W/AUTO DIFF WBC: CPT | Performed by: NURSE PRACTITIONER

## 2025-02-17 PROCEDURE — 82010 KETONE BODYS QUAN: CPT | Performed by: NURSE PRACTITIONER

## 2025-02-17 PROCEDURE — 96360 HYDRATION IV INFUSION INIT: CPT

## 2025-02-17 PROCEDURE — 81000 URINALYSIS NONAUTO W/SCOPE: CPT | Performed by: NURSE PRACTITIONER

## 2025-02-17 PROCEDURE — 99285 EMERGENCY DEPT VISIT HI MDM: CPT | Mod: 25

## 2025-02-17 PROCEDURE — 87389 HIV-1 AG W/HIV-1&-2 AB AG IA: CPT | Performed by: EMERGENCY MEDICINE

## 2025-02-17 PROCEDURE — 86803 HEPATITIS C AB TEST: CPT | Performed by: EMERGENCY MEDICINE

## 2025-02-17 RX ADMIN — SODIUM CHLORIDE 1000 ML: 0.9 INJECTION, SOLUTION INTRAVENOUS at 03:02

## 2025-02-17 NOTE — ED TRIAGE NOTES
Pt reports frequent urination for the past week. She states she has been checking her sugar this week and they have been running in the 120-140. She states her mouth is dry and she is drinking to satisfy the dryness and nothing is quenching her thirst. Pt also reports casey lumbar pain that radiates down both of her legs

## 2025-02-17 NOTE — FIRST PROVIDER EVALUATION
" Emergency Department TeleTriage Encounter Note      CHIEF COMPLAINT    Chief Complaint   Patient presents with    Back Pain     Back pain that radiates to buttocks and legs. Pt states " I think I am dehydrated. Everything I intake I pee it out". Pt reports urinary frequency x 1 week. Hx of DM.  in triage. Pt ate breakfast and did not take insulin this am.        VITAL SIGNS   Initial Vitals [02/17/25 1156]   BP Pulse Resp Temp SpO2   (!) 158/74 97 20 98.5 °F (36.9 °C) (!) 10 %      MAP       --            ALLERGIES    Review of patient's allergies indicates:   Allergen Reactions    Asa [aspirin]      Tongue lump up and nauseated       PROVIDER TRIAGE NOTE  This is a teletriage evaluation of a 44 y.o. female presenting to the ED complaining of back pain and urinary frequency for one week. Elevated glucose.     Alert, no distress.     SpO2 documented at 10%.  Pt is alert, well-appearing, speaking in complete sentences.   Tech notified to recheck and correct documentation.     Initial orders will be placed and care will be transferred to an alternate provider when patient is roomed for a full evaluation. Any additional orders and the final disposition will be determined by that provider.         ORDERS  Labs Reviewed   POCT GLUCOSE - Abnormal       Result Value    POCT Glucose 395 (*)    HEPATITIS C ANTIBODY   HIV 1 / 2 ANTIBODY   CBC W/ AUTO DIFFERENTIAL   COMPREHENSIVE METABOLIC PANEL   BETA - HYDROXYBUTYRATE, SERUM   URINALYSIS, REFLEX TO URINE CULTURE   POCT GLUCOSE MONITORING CONTINUOUS   POCT GLUCOSE MONITORING CONTINUOUS       ED Orders (720h ago, onward)      Start Ordered     Status Ordering Provider    02/17/25 1400 02/17/25 1226  Vital signs  Every 2 hours         Ordered MADDY PURI N.    02/17/25 1230 02/17/25 1226  sodium chloride 0.9% bolus 1,000 mL 1,000 mL  ED 1 Time         Ordered MADDY PURI N.    02/17/25 1227 02/17/25 1226  Cardiac Monitoring - Adult  Continuous      "    Ordered KHUSHI MADDY N.    02/17/25 1227 02/17/25 1226  Pulse Oximetry Continuous  Continuous         Ordered KHUSHI MADDY N.    02/17/25 1227 02/17/25 1226  Saline lock IV  Once         Ordered KHUSHI MADDY N.    02/17/25 1227 02/17/25 1226  CBC auto differential  STAT         Ordered LORISANIYA MADDY N.    02/17/25 1227 02/17/25 1226  Comprehensive metabolic panel  STAT         Ordered LORISANIYA MADDY N.    02/17/25 1227 02/17/25 1226  Beta - Hydroxybutyrate, Serum  STAT         Ordered LORISANIYA MADDY N.    02/17/25 1227 02/17/25 1226  POCT glucose  Once         Ordered KHUSHI MADDY N.    02/17/25 1227 02/17/25 1226  Urinalysis, Reflex to Urine Culture Urine, Clean Catch  STAT         Ordered LORISANIYA MADDY N.    02/17/25 1227 02/17/25 1226  EKG 12-lead  Once         Ordered KHUSHI MADDY N.    02/17/25 1227 02/17/25 1226  X-Ray Chest AP Portable  1 time imaging         Ordered KHUSHI MADDY N.    02/17/25 1227 02/17/25 1226  POCT Venous Blood Gas  Once        Comments: This test should be used for VBGs.  If using this order for other tests (K, creatinine, HCT, PT/INR, lactate etc)  ONLY do so in the case of an emergency or rapid response.Notify Physician if: see parameters below.      Ordered YUNICESAR MADDY N.    02/17/25 1158 02/17/25 1157  Hepatitis C Antibody  STAT         Ordered VELVET SANTILLAN.    02/17/25 1158 02/17/25 1157  HIV 1/2 Ag/Ab (4th Gen)  STAT         Ordered VELVET SANTILLAN.    02/17/25 1158 02/17/25 1157  POCT glucose  Once         Ordered VELVET SANTILLAN.    02/17/25 1155 02/17/25 1155  POCT glucose  Once         Final result EMERGENCY, DEPT PHYSICIAN              Virtual Visit Note: The provider triage portion of this emergency department evaluation and documentation was performed via [employer], a HIPAA-compliant telemedicine application, in concert with a tele-presenter in the room. A face  to face patient evaluation with one of my colleagues will occur once the patient is placed in an emergency department room.      DISCLAIMER: This note was prepared with [employer] voice recognition transcription software. Garbled syntax, mangled pronouns, and other bizarre constructions may be attributed to that software system.

## 2025-02-17 NOTE — Clinical Note
"Car "Krunaljadon Barrios was seen and treated in our emergency department on 2/17/2025.  She may return to work on 02/18/2025.       If you have any questions or concerns, please don't hesitate to call.      Vanessa Deutsch, RIGOBERTOP"

## 2025-02-18 DIAGNOSIS — S39.012A LUMBAR STRAIN, INITIAL ENCOUNTER: Primary | ICD-10-CM

## 2025-02-18 RX ORDER — METHOCARBAMOL 500 MG/1
1000 TABLET, FILM COATED ORAL 3 TIMES DAILY
Qty: 30 TABLET | Refills: 0 | Status: SHIPPED | OUTPATIENT
Start: 2025-02-18 | End: 2025-02-23

## 2025-02-18 RX ORDER — IBUPROFEN 800 MG/1
800 TABLET ORAL EVERY 6 HOURS PRN
Qty: 20 TABLET | Refills: 0 | Status: SHIPPED | OUTPATIENT
Start: 2025-02-18

## 2025-02-18 NOTE — ED PROVIDER NOTES
"Encounter Date: 2025       History     Chief Complaint   Patient presents with    Back Pain     Back pain that radiates to buttocks and legs. Pt states " I think I am dehydrated. Everything I intake I pee it out". Pt reports urinary frequency x 1 week. Hx of DM.  in triage. Pt ate breakfast and did not take insulin this am.      45 y/o female with DM and HTN which presents to the ED with lower back pain that radiates to her buttocks and legs. She has also noticed for the past week that she has had urinary frequency and feels like the minute she drinks something, she has to urinate. Elevated glucose this am but the patient admits that she did not take her insulin last night or this morning. She did eat prior to coming to the ED and feels that is contributing to her elevated glucose. Denies fevers or any other symptoms.     The history is provided by the patient.     Review of patient's allergies indicates:   Allergen Reactions    Asa [aspirin]      Tongue lump up and nauseated     Past Medical History:   Diagnosis Date    Diabetes mellitus     Hypertension      Past Surgical History:   Procedure Laterality Date    ANKLE SURGERY Bilateral      SECTION      HYSTERECTOMY       No family history on file.  Social History[1]  Review of Systems   Constitutional:  Negative for fever.   HENT:  Negative for sore throat.    Respiratory:  Negative for shortness of breath.    Cardiovascular:  Negative for chest pain.   Gastrointestinal:  Negative for nausea.   Endocrine: Positive for polydipsia and polyuria.   Genitourinary:  Negative for dysuria.   Musculoskeletal:  Positive for back pain.   Skin:  Negative for rash.   Neurological:  Negative for weakness.   Hematological:  Does not bruise/bleed easily.   All other systems reviewed and are negative.      Physical Exam     Initial Vitals [25 1156]   BP Pulse Resp Temp SpO2   (!) 158/74 97 20 98.5 °F (36.9 °C) 100 %      MAP       --         Physical " Exam    Nursing note and vitals reviewed.  Constitutional: She appears well-developed and well-nourished.   HENT:   Head: Normocephalic and atraumatic.   Moist mucous oral membranes   Eyes: Conjunctivae and EOM are normal. Pupils are equal, round, and reactive to light.   Neck:   Normal range of motion.  Cardiovascular:  Normal rate, regular rhythm, normal heart sounds and intact distal pulses.     Exam reveals no gallop and no friction rub.       No murmur heard.  Pulmonary/Chest: Breath sounds normal. No respiratory distress. She has no wheezes. She has no rhonchi. She has no rales. She exhibits no tenderness.   Abdominal: Abdomen is soft. Bowel sounds are normal. She exhibits no distension and no mass. There is no abdominal tenderness.   Right CVA tenderness There is no rebound and no guarding.   Musculoskeletal:         General: No tenderness or edema. Normal range of motion.      Cervical back: Normal range of motion.     Neurological: She is alert and oriented to person, place, and time. She has normal strength. GCS score is 15. GCS eye subscore is 4. GCS verbal subscore is 5. GCS motor subscore is 6.   Skin: Skin is warm. Capillary refill takes less than 2 seconds. No rash noted. No erythema.   Psychiatric: She has a normal mood and affect.       ED Course   Procedures  Labs Reviewed   CBC W/ AUTO DIFFERENTIAL - Abnormal       Result Value    WBC 6.03      RBC 4.54      Hemoglobin 12.5      Hematocrit 38.1      MCV 84      MCH 27.5      MCHC 32.8      RDW 14.3      Platelets 290      MPV 10.0      Immature Granulocytes 0.8 (*)     Gran # (ANC) 2.9      Immature Grans (Abs) 0.05 (*)     Lymph # 2.4      Mono # 0.4      Eos # 0.2      Baso # 0.07      nRBC 0      Gran % 47.6      Lymph % 39.5      Mono % 7.1      Eosinophil % 3.8      Basophil % 1.2      Differential Method Automated     COMPREHENSIVE METABOLIC PANEL - Abnormal    Sodium 135 (*)     Potassium 4.3      Chloride 101      CO2 25      Glucose 414  (*)     BUN 9      Creatinine 0.9      Calcium 9.3      Total Protein 7.5      Albumin 3.7      Total Bilirubin 0.4      Alkaline Phosphatase 81      AST 24      ALT 27      eGFR >60      Anion Gap 9     URINALYSIS, REFLEX TO URINE CULTURE - Abnormal    Specimen UA Urine, Clean Catch      Color, UA Yellow      Appearance, UA Clear      pH, UA 7.0      Specific Gravity, UA >1.030 (*)     Protein, UA Negative      Glucose, UA 4+ (*)     Ketones, UA Negative      Bilirubin (UA) Negative      Occult Blood UA Negative      Nitrite, UA Negative      Urobilinogen, UA Negative      Leukocytes, UA Negative      Narrative:     Specimen Source->Urine   URINALYSIS MICROSCOPIC - Abnormal    Bacteria None      Yeast, UA Rare (*)     Squam Epithel, UA 2      Microscopic Comment SEE COMMENT      Narrative:     Specimen Source->Urine   POCT GLUCOSE - Abnormal    POCT Glucose 395 (*)    POCT GLUCOSE - Abnormal    POCT Glucose 382 (*)    POCT GLUCOSE - Abnormal    POCT Glucose 327 (*)    POCT GLUCOSE - Abnormal    POCT Glucose 267 (*)    HEPATITIS C ANTIBODY    Hepatitis C Ab Negative      Narrative:     Release to patient->Immediate   HIV 1 / 2 ANTIBODY    HIV 1/2 Ag/Ab Negative      Narrative:     Release to patient->Immediate   BETA - HYDROXYBUTYRATE, SERUM    Beta-Hydroxybutyrate 0.1            Imaging Results              X-Ray Chest 1 View (Final result)  Result time 02/17/25 14:29:30   Procedure changed from X-Ray Chest AP Portable     Final result by Harriett Desai MD (02/17/25 14:29:30)                   Impression:      No acute cardiopulmonary process seen.      Electronically signed by: Harriett Desai  Date:    02/17/2025  Time:    14:29               Narrative:    EXAMINATION:  XR CHEST 1 VIEW    CLINICAL HISTORY:  hyperglycemia;    TECHNIQUE:  Single frontal view of the chest was performed.    COMPARISON:  07/16/2024    FINDINGS:  Lungs are well expanded.  No acute consolidation, pleural effusion, or  pneumothorax.    Cardiac silhouette is normal in size.                                       Medications   sodium chloride 0.9% bolus 1,000 mL 1,000 mL (0 mLs Intravenous Stopped 2/17/25 1615)     Medical Decision Making  43 y/o female which presents with polydipsia, polyuria, and back pain. Her labs show elevated glucose. She was given a liter of NS and her blood sugar lowered to 267. NO evidence of DKA or UTI on exam. Concerns for pyelonephritis but given a clean urinalysis and normal WBC this is unlikely. Pt advised that if she continues to have back pain to have her urine reevaluated. The CVA tenderness could be muscular in nature. She was given robaxin and ibuprofen for pain relief and advised to complete stretches provided. She will be referred to  healthy back program for further evaluation. Patient given strict return precautions and voiced understanding of all discharge instructions. Pt was stable at discharge.       Differential Diagnosis: lumbar strain, UTI, DKA, pyelonephritis, hyperglycemia    Problems Addressed:  Hyperglycemia: acute illness or injury  Lumbar strain, initial encounter: acute illness or injury  Urinary frequency: acute illness or injury    Amount and/or Complexity of Data Reviewed  Labs:  Decision-making details documented in ED Course.    Risk  Prescription drug management.               ED Course as of 02/18/25 1313   Mon Feb 17, 2025   1453 BP(!): 158/74 [AT]   1453 Temp: 98.5 °F (36.9 °C) [AT]   1453 Pulse: 97 [AT]   1453 Temp Source: Oral [AT]   1453 Resp: 20 [AT]   1453 SpO2: 100 % [AT]   1717 POCT Glucose(!): 327 [AT]   1717 BP(!): 145/80 [AT]   1717 Temp: 97.9 °F (36.6 °C) [AT]   1717 Pulse: 83 [AT]   1717 SpO2: 100 % [AT]   1750 Yeast, UA(!): Rare [AT]      ED Course User Index  [AT] Vanessa Deutsch FNP                           Clinical Impression:  Final diagnoses:  [R73.9] Hyperglycemia  [S39.012A] Lumbar strain, initial encounter (Primary)  [R35.0] Urinary frequency           ED Disposition Condition    Discharge Stable          ED Prescriptions    None       Follow-up Information       Follow up With Specialties Details Why Contact Info    Adrile Mcbride Jr., MD Hospitalist, Family Medicine Schedule an appointment as soon as possible for a visit  As needed [address] [facility] [address] [facility] [address] [facility]  [address]  [facility] [address]  [phone]      [facility] - Emergency Dept Emergency Medicine  If symptoms worsen [address] [facility] [facility] [id number] [phone]               [1]   Social History  Tobacco Use    Smoking status: Never    Smokeless tobacco: Never   Substance Use Topics    Alcohol use: Yes     Comment: socially    Drug use: No        Vanessa Deutsch, P  02/18/25 1310

## 2025-02-19 LAB
OHS QRS DURATION: 70 MS
OHS QTC CALCULATION: 423 MS